# Patient Record
Sex: MALE | Race: WHITE | NOT HISPANIC OR LATINO | Employment: FULL TIME | URBAN - METROPOLITAN AREA
[De-identification: names, ages, dates, MRNs, and addresses within clinical notes are randomized per-mention and may not be internally consistent; named-entity substitution may affect disease eponyms.]

---

## 2017-03-24 ENCOUNTER — ALLSCRIPTS OFFICE VISIT (OUTPATIENT)
Dept: OTHER | Facility: OTHER | Age: 62
End: 2017-03-24

## 2017-03-24 DIAGNOSIS — E78.00 PURE HYPERCHOLESTEROLEMIA: ICD-10-CM

## 2017-03-24 DIAGNOSIS — M25.511 PAIN IN RIGHT SHOULDER: ICD-10-CM

## 2017-04-05 ENCOUNTER — TRANSCRIBE ORDERS (OUTPATIENT)
Dept: ADMINISTRATIVE | Facility: HOSPITAL | Age: 62
End: 2017-04-05

## 2017-04-05 ENCOUNTER — HOSPITAL ENCOUNTER (OUTPATIENT)
Dept: RADIOLOGY | Facility: HOSPITAL | Age: 62
Discharge: HOME | End: 2017-04-05
Attending: FAMILY MEDICINE
Payer: COMMERCIAL

## 2017-04-05 DIAGNOSIS — M25.511 RIGHT SHOULDER PAIN, UNSPECIFIED CHRONICITY: ICD-10-CM

## 2017-04-05 DIAGNOSIS — M25.511 RIGHT SHOULDER PAIN, UNSPECIFIED CHRONICITY: Primary | ICD-10-CM

## 2017-04-05 PROCEDURE — 73030 X-RAY EXAM OF SHOULDER: CPT

## 2017-04-06 ENCOUNTER — GENERIC CONVERSION - ENCOUNTER (OUTPATIENT)
Dept: OTHER | Facility: OTHER | Age: 62
End: 2017-04-06

## 2017-04-06 LAB
AMBIG ABBREV CMP14 DEFAULT (HISTORICAL): NORMAL
AMBIG ABBREV LP DEFAULT (HISTORICAL): NORMAL
BASOPHILS # BLD AUTO: 0.1 X10E3/UL (ref 0–0.2)
BASOPHILS # BLD AUTO: 1 %
DEPRECATED RDW RBC AUTO: 14.2 % (ref 12.3–15.4)
EOSINOPHIL # BLD AUTO: 0.1 X10E3/UL (ref 0–0.4)
EOSINOPHIL # BLD AUTO: 2 %
HCT VFR BLD AUTO: 43.5 % (ref 37.5–51)
HGB BLD-MCNC: 15 G/DL (ref 12.6–17.7)
IMM.GRANULOCYTES (CD4/8) (HISTORICAL): 0 %
IMM.GRANULOCYTES (CD4/8) (HISTORICAL): 0 X10E3/UL (ref 0–0.1)
LYMPHOCYTES # BLD AUTO: 1.7 X10E3/UL (ref 0.7–3.1)
LYMPHOCYTES # BLD AUTO: 33 %
MCH RBC QN AUTO: 29.9 PG (ref 26.6–33)
MCHC RBC AUTO-ENTMCNC: 34.5 G/DL (ref 31.5–35.7)
MCV RBC AUTO: 87 FL (ref 79–97)
MONOCYTES # BLD AUTO: 0.4 X10E3/UL (ref 0.1–0.9)
MONOCYTES (HISTORICAL): 9 %
NEUTROPHILS # BLD AUTO: 2.9 X10E3/UL (ref 1.4–7)
NEUTROPHILS # BLD AUTO: 55 %
PLATELET # BLD AUTO: 382 X10E3/UL (ref 150–379)
RBC (HISTORICAL): 5.02 X10E6/UL (ref 4.14–5.8)
WBC # BLD AUTO: 5.2 X10E3/UL (ref 3.4–10.8)

## 2017-04-07 LAB
A/G RATIO (HISTORICAL): 1.8 (ref 1.2–2.2)
ALBUMIN SERPL BCP-MCNC: 4.3 G/DL (ref 3.6–4.8)
ALP SERPL-CCNC: 68 IU/L (ref 39–117)
ALT SERPL W P-5'-P-CCNC: 19 IU/L (ref 0–44)
AST SERPL W P-5'-P-CCNC: 21 IU/L (ref 0–40)
BILIRUB SERPL-MCNC: 0.5 MG/DL (ref 0–1.2)
BUN SERPL-MCNC: 20 MG/DL (ref 8–27)
BUN/CREA RATIO (HISTORICAL): 27 (ref 10–24)
CALCIUM SERPL-MCNC: 9.4 MG/DL (ref 8.6–10.2)
CHLORIDE SERPL-SCNC: 100 MMOL/L (ref 96–106)
CHOLEST SERPL-MCNC: 219 MG/DL (ref 100–199)
CO2 SERPL-SCNC: 20 MMOL/L (ref 18–29)
CREAT SERPL-MCNC: 0.75 MG/DL (ref 0.76–1.27)
EGFR AFRICAN AMERICAN (HISTORICAL): 115 ML/MIN/1.73
EGFR-AMERICAN CALC (HISTORICAL): 100 ML/MIN/1.73
GLUCOSE SERPL-MCNC: 94 MG/DL (ref 65–99)
HDLC SERPL-MCNC: 57 MG/DL
LDLC SERPL CALC-MCNC: 143 MG/DL (ref 0–99)
POTASSIUM SERPL-SCNC: 4.6 MMOL/L (ref 3.5–5.2)
SODIUM SERPL-SCNC: 138 MMOL/L (ref 134–144)
TOT. GLOBULIN, SERUM (HISTORICAL): 2.4 G/DL (ref 1.5–4.5)
TOTAL PROTEIN (HISTORICAL): 6.7 G/DL (ref 6–8.5)
TRIGL SERPL-MCNC: 94 MG/DL (ref 0–149)
VLDLC SERPL CALC-MCNC: 19 MG/DL (ref 5–40)

## 2017-05-05 ENCOUNTER — ALLSCRIPTS OFFICE VISIT (OUTPATIENT)
Dept: OTHER | Facility: OTHER | Age: 62
End: 2017-05-05

## 2017-05-05 DIAGNOSIS — R22.31 LOCALIZED SWELLING, MASS AND LUMP, RIGHT UPPER LIMB: ICD-10-CM

## 2017-05-05 DIAGNOSIS — M25.511 PAIN IN RIGHT SHOULDER: ICD-10-CM

## 2017-05-11 ENCOUNTER — HOSPITAL ENCOUNTER (OUTPATIENT)
Dept: RADIOLOGY | Facility: HOSPITAL | Age: 62
Discharge: HOME | End: 2017-05-11
Payer: COMMERCIAL

## 2017-05-11 DIAGNOSIS — R22.31 LOCALIZED SWELLING, MASS AND LUMP, RIGHT UPPER LIMB: ICD-10-CM

## 2017-05-11 PROCEDURE — 76882 US LMTD JT/FCL EVL NVASC XTR: CPT

## 2017-05-19 ENCOUNTER — ALLSCRIPTS OFFICE VISIT (OUTPATIENT)
Dept: OTHER | Facility: OTHER | Age: 62
End: 2017-05-19

## 2017-05-21 ENCOUNTER — HOSPITAL ENCOUNTER (EMERGENCY)
Facility: HOSPITAL | Age: 62
Discharge: HOME | End: 2017-05-21
Attending: EMERGENCY MEDICINE | Admitting: EMERGENCY MEDICINE
Payer: COMMERCIAL

## 2017-05-21 VITALS
WEIGHT: 162 LBS | DIASTOLIC BLOOD PRESSURE: 98 MMHG | SYSTOLIC BLOOD PRESSURE: 156 MMHG | HEART RATE: 86 BPM | HEIGHT: 70 IN | TEMPERATURE: 95.8 F | RESPIRATION RATE: 18 BRPM | BODY MASS INDEX: 23.19 KG/M2 | OXYGEN SATURATION: 98 %

## 2017-05-21 DIAGNOSIS — S61.219A FINGER LACERATION, INITIAL ENCOUNTER: Primary | ICD-10-CM

## 2017-05-21 PROCEDURE — 90471 IMMUNIZATION ADMIN: CPT

## 2017-05-21 PROCEDURE — 90715 TDAP VACCINE 7 YRS/> IM: CPT | Performed by: EMERGENCY MEDICINE

## 2017-05-21 PROCEDURE — 99282 EMERGENCY DEPT VISIT SF MDM: CPT

## 2017-05-21 RX ADMIN — TETANUS TOXOID, REDUCED DIPHTHERIA TOXOID AND ACELLULAR PERTUSSIS VACCINE, ADSORBED 0.5 ML: 5; 2.5; 8; 8; 2.5 SUSPENSION INTRAMUSCULAR at 15:16

## 2017-05-22 LAB — LYME IGG/IGM AB (HISTORICAL): <0.9 INDEX

## 2017-05-23 ENCOUNTER — APPOINTMENT (OUTPATIENT)
Dept: PHYSICAL THERAPY | Facility: CLINIC | Age: 62
End: 2017-05-23
Payer: COMMERCIAL

## 2017-05-23 DIAGNOSIS — M25.511 PAIN IN RIGHT SHOULDER: ICD-10-CM

## 2017-05-23 PROCEDURE — 97161 PT EVAL LOW COMPLEX 20 MIN: CPT

## 2017-05-24 ENCOUNTER — GENERIC CONVERSION - ENCOUNTER (OUTPATIENT)
Dept: OTHER | Facility: OTHER | Age: 62
End: 2017-05-24

## 2017-06-01 ENCOUNTER — APPOINTMENT (OUTPATIENT)
Dept: PHYSICAL THERAPY | Facility: CLINIC | Age: 62
End: 2017-06-01
Payer: COMMERCIAL

## 2017-06-01 PROCEDURE — 97110 THERAPEUTIC EXERCISES: CPT

## 2017-06-08 ENCOUNTER — APPOINTMENT (OUTPATIENT)
Dept: PHYSICAL THERAPY | Facility: CLINIC | Age: 62
End: 2017-06-08
Payer: COMMERCIAL

## 2017-06-08 PROCEDURE — 97110 THERAPEUTIC EXERCISES: CPT

## 2017-06-13 ENCOUNTER — ALLSCRIPTS OFFICE VISIT (OUTPATIENT)
Dept: OTHER | Facility: OTHER | Age: 62
End: 2017-06-13

## 2017-06-15 ENCOUNTER — APPOINTMENT (OUTPATIENT)
Dept: PHYSICAL THERAPY | Facility: CLINIC | Age: 62
End: 2017-06-15
Payer: COMMERCIAL

## 2017-06-15 PROCEDURE — 97110 THERAPEUTIC EXERCISES: CPT

## 2017-06-21 ENCOUNTER — APPOINTMENT (OUTPATIENT)
Dept: PHYSICAL THERAPY | Facility: CLINIC | Age: 62
End: 2017-06-21
Payer: COMMERCIAL

## 2017-06-21 PROCEDURE — 97110 THERAPEUTIC EXERCISES: CPT

## 2017-06-22 ENCOUNTER — APPOINTMENT (OUTPATIENT)
Dept: PHYSICAL THERAPY | Facility: CLINIC | Age: 62
End: 2017-06-22
Payer: COMMERCIAL

## 2017-06-29 ENCOUNTER — APPOINTMENT (OUTPATIENT)
Dept: PHYSICAL THERAPY | Facility: CLINIC | Age: 62
End: 2017-06-29
Payer: COMMERCIAL

## 2017-11-21 ENCOUNTER — GENERIC CONVERSION - ENCOUNTER (OUTPATIENT)
Dept: OTHER | Facility: OTHER | Age: 62
End: 2017-11-21

## 2017-11-21 DIAGNOSIS — M54.10 RADICULOPATHY: ICD-10-CM

## 2018-01-12 VITALS
BODY MASS INDEX: 22.96 KG/M2 | RESPIRATION RATE: 18 BRPM | OXYGEN SATURATION: 98 % | WEIGHT: 164 LBS | HEIGHT: 71 IN | SYSTOLIC BLOOD PRESSURE: 150 MMHG | HEART RATE: 80 BPM | DIASTOLIC BLOOD PRESSURE: 90 MMHG

## 2018-01-13 VITALS
TEMPERATURE: 96.3 F | SYSTOLIC BLOOD PRESSURE: 112 MMHG | DIASTOLIC BLOOD PRESSURE: 80 MMHG | HEART RATE: 68 BPM | WEIGHT: 162 LBS | HEIGHT: 71 IN | OXYGEN SATURATION: 98 % | BODY MASS INDEX: 22.68 KG/M2 | RESPIRATION RATE: 18 BRPM

## 2018-01-13 VITALS
BODY MASS INDEX: 22.68 KG/M2 | DIASTOLIC BLOOD PRESSURE: 72 MMHG | RESPIRATION RATE: 18 BRPM | WEIGHT: 162 LBS | OXYGEN SATURATION: 98 % | SYSTOLIC BLOOD PRESSURE: 128 MMHG | HEART RATE: 92 BPM | HEIGHT: 71 IN

## 2018-01-15 VITALS
WEIGHT: 170 LBS | RESPIRATION RATE: 18 BRPM | DIASTOLIC BLOOD PRESSURE: 84 MMHG | HEART RATE: 74 BPM | BODY MASS INDEX: 23.8 KG/M2 | SYSTOLIC BLOOD PRESSURE: 140 MMHG | HEIGHT: 71 IN | OXYGEN SATURATION: 98 %

## 2018-01-16 NOTE — PROGRESS NOTES
Assessment    1  Encounter for preventive health examination (V70 0) (Z00 00)   2  Encounter for screening colonoscopy (V76 51) (Z12 11)   3  Right shoulder pain (719 41) (M25 511)   4  Influenza vaccine refused (V64 06) (Z28 21)   5  Hypercholesteremia (272 0) (E78 00)   6  Screening for depression (V79 0) (Z13 89)   7  Elevated BP without diagnosis of hypertension (796 2) (R03 0)    Plan  Encounter for screening colonoscopy    · COLONOSCOPY; Status:Active; Requested for:24Mar2017;   Hypercholesteremia    · (1) CBC/PLT/DIFF; Status:Active; Requested for:24Mar2017;    · (1) COMPREHENSIVE METABOLIC PANEL; Status:Active; Requested for:24Mar2017;    · (1) LIPID PANEL, FASTING; Status:Active; Requested for:24Mar2017; Influenza vaccine refused    · Stop: Influenza  Right shoulder pain    · * XR SHOULDER 2+ VIEW RIGHT; Status:Active; Requested for:24Mar2017;   Screening for depression    · *VB-Depression Screening; Status:Resulted - Requires Verification;   Done: [id number]  01:48PM    Discussion/Summary  Impression: healthy adult male  Currently, he eats a healthy diet and has an adequate exercise regimen  Prostate cancer screening: prostate cancer screening is managed by Dr Shareen Primrose  Colorectal cancer screening: colonoscopy has been ordered  Screening lab work includes glucose and lipid profile  The patient declines immunizations  He was advised to be evaluated by an optometrist  Advice and education were given regarding helmet use  Patient discussion: discussed with the patient  Right Shoulder Pain: Order for right shoulder xray given today  Patient may have component MSK dysfunction causing him the pain  I do not believe that the patient's arm mass is the source of his pain  If xray negative  Will consider physical therapy trial for strengthening and stretching  Further consultation with specialist should also be considered  Arm Mass: Likely clinical secondary to benign mass such as a lipoma   Patient advised that we could continue to monitor for changes in size and or symptoms such as pain, at which time we can get a surgical consult, or have it addressed now  Patient prefers to wait  Elevated Blood Pressure: Patient has borderline HTN  Patient prefers to try lifestyle changes initially, specifically a low-salt diet as he admits to eating a lot of salt  Hypercholesterolemia: History of elevated cholesterol  Lipid panel order provided today  Screening: Colonoscopy ordered today  Blood work ordered today    Follow up in 2 weeks for xray and blood work results  Chief Complaint  CPE      History of Present Illness  HM, Adult Male: The patient is being seen for a health maintenance evaluation  The last health maintenance visit was 1-2 year(s) ago  Social History: Household members include spouse, 1 daughter(s) and 1 son(s)  He is   Work status: working full time and occupation: design and sales  The patient is a former cigarette smoker  He reports occasional alcohol use  He has never used illicit drugs  General Health: The patient's health since the last visit is described as good  He has regular dental visits  The patient brushes 2 time(s) a day and flosses occasionally  Dental problems: no tooth pain and no caries  He complains of vision problems  Vision care includes wearing reading glasses  The patient complains of worsening vision  Lifestyle:  He consumes a diverse and healthy diet  Dietary details include 2 servings of fruit per day, 2 servings of vegetables per day, 1 servings of meat per day, 42 ounces of water per day, 2 cups of coffee per day, 2 cups of tea per day and 0 cans of regular soda per day  He exercises regularly  He exercises 3 or more times per week  Exercise includes biking  Reproductive health:  the patient is sexually active  He is monogamous with a female partner  Screening:   HPI: 63year old male comes to the office today for a well visit   Patient has history of prostate cancer and is currently being followed by urology (Dr Therese Hicks)  Patient is also concerned about right shoulder pain and a lump on the right bicep area  He first noted the bump approximately 6 months ago  He has not noticed a change in size  States that he occasionally gets arm pain and is no sure if it's related to the lump  Does not have a similar lump elsewhere  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  Review of Systems    Constitutional: no fever and no chills  Eyes: no eyesight problems  ENT: no sore throat and no nasal discharge  Cardiovascular: no chest pain and no palpitations  Respiratory: no shortness of breath and no cough  Gastrointestinal: no abdominal pain, no nausea, no vomiting, no constipation and no diarrhea  Genitourinary: no dysuria  Musculoskeletal: myalgias, but as noted in HPI  Integumentary: skin lesion, but as noted in HPI  Active Problems    1  Elevated BP without diagnosis of hypertension (796 2) (R03 0)   2  Influenza vaccine refused (V64 06) (Z28 21)    Past Medical History    · History of Cyst of neck (706 2) (L72 3)   · History of Dysuria (788 1) (R30 0)   · History of Elevated PSA (790 93) (R97 20)   · History of malignant neoplasm of prostate (V10 46) (Z85 46)   · History of Tick bite (919 4,E906 4) (W57 XXXA)   · History of UTI (lower urinary tract infection) (599 0) (N39 0)    Family History  Mother    · Family history of leukemia (V16 6) (Z80 6)   · Family history of multiple sclerosis (V17 2) (Z82 0)  Sister    · Family history of multiple sclerosis (V17 2) (Z82 0)    Social History    · Never a smoker   · Non-smoker (V49 89) (Z78 9)    Current Meds   1  No Reported Medications Recorded    Allergies    1  No Known Drug Allergies    2   No Known Latex Allergies    Vitals   Recorded: 22XGQ0717 01:04PM   Heart Rate 74   Respiration 18   Systolic 247   Diastolic 84   Height 5 ft 10 5 in   Weight 170 lb    BMI Calculated 24 05   BSA Calculated 1 96   O2 Saturation 98     Physical Exam    Constitutional   General appearance: No acute distress, well appearing and well nourished  Head and Face   Head and face: Normal     Palpation of the face and sinuses: No sinus tenderness  Eyes   Conjunctiva and lids: No erythema, swelling or discharge  Pupils and irises: Equal, round, reactive to light  Ears, Nose, Mouth, and Throat   External inspection of ears and nose: Normal     Otoscopic examination: Tympanic membranes translucent with normal light reflex  Canals patent without erythema  Nasal mucosa, septum, and turbinates: Normal without edema or erythema  Lips, teeth, and gums: Normal, good dentition  Oropharynx: Normal with no erythema, edema, exudate or lesions  Pulmonary   Respiratory effort: No increased work of breathing or signs of respiratory distress  Auscultation of lungs: Clear to auscultation  Cardiovascular   Auscultation of heart: Normal rate and rhythm, normal S1 and S2, no murmurs  Abdomen   Abdomen: Non-tender, no masses  Lymphatic   Palpation of lymph nodes in neck: No lymphadenopathy  Palpation of lymph nodes in axillae: No lymphadenopathy  Musculoskeletal + [place]'s and Dynamic Speed test of right shoulder; full range of motion; 5/5 strength in UE's bilaterally; [place] Muscle strength/tone: Normal     Skin   Examination of the skin for lesions: Abnormal   right mass approximately 3cm in diameter located medial aspect of upper arm at inferior-mid area; soft, non-tender, mobile  Psychiatric   Recent and remote memory: Intact      Mood and affect: Normal        Results/Data  *VB-Depression Screening [id number] 01:48PM Rolando Child     Test Name Result Flag Reference   Depression Scale Result      Depression Screen - Negative For Symptoms     PHQ-9 Adult Depression Screening 24Mar2017 01:47PM Hilary Clark     Test Name Result Flag Reference   PHQ-9 Adult Depression Score 0 Over the last two weeks, how often have you been bothered by any of the following problems? Little interest or pleasure in doing things: Not at all - 0  Feeling down, depressed, or hopeless: Not at all - 0  Trouble falling or staying asleep, or sleeping too much: Not at all - 0  Feeling tired or having little energy: Not at all - 0  Poor appetite or over eating: Not at all - 0  Feeling bad about yourself - or that you are a failure or have let yourself or your family down: Not at all - 0  Trouble concentrating on things, such as reading the newspaper or watching television: Not at all - 0  Moving or speaking so slowly that other people could have noticed  Or the opposite -  being so fidgety or restless that you have been moving around a lot more than usual: Not at all - 0  Thoughts that you would be better off dead, or of hurting yourself in some way: Not at all - 0   PHQ-9 Adult Depression Screening Negative     PHQ-9 Difficulty Level Not difficult at all     PHQ-9 Severity No Depression         Attending Note  Attending Note: Attending Note: I did not interview and examine the patient, I supervised the Resident and I agree with the Resident management plan as it was presented to me  Level of Participation: I was present in clinic, but did not examine the patient  I agree with the Resident's note  Signatures   Electronically signed by : SLICK Felipe ; Mar 24 2017  3:23PM EST                       (Author)    Electronically signed by :  SLICK Kerr ; Mar 24 2017  3:27PM EST                       (Co-author)

## 2018-01-22 VITALS
RESPIRATION RATE: 18 BRPM | TEMPERATURE: 98.6 F | OXYGEN SATURATION: 98 % | DIASTOLIC BLOOD PRESSURE: 100 MMHG | HEART RATE: 74 BPM | HEIGHT: 71 IN | SYSTOLIC BLOOD PRESSURE: 140 MMHG | BODY MASS INDEX: 23.24 KG/M2 | WEIGHT: 166 LBS

## 2018-02-01 ENCOUNTER — TRANSCRIBE ORDERS (OUTPATIENT)
Dept: ADMINISTRATIVE | Facility: HOSPITAL | Age: 63
End: 2018-02-01

## 2018-02-01 ENCOUNTER — HOSPITAL ENCOUNTER (OUTPATIENT)
Dept: RADIOLOGY | Facility: HOSPITAL | Age: 63
Discharge: HOME | End: 2018-02-01
Attending: FAMILY MEDICINE
Payer: COMMERCIAL

## 2018-02-01 DIAGNOSIS — M54.10 RADICULOPATHY: ICD-10-CM

## 2018-02-01 DIAGNOSIS — M54.10 RADICULAR SYNDROME OF LOWER LIMBS: Primary | ICD-10-CM

## 2018-02-01 PROCEDURE — 72050 X-RAY EXAM NECK SPINE 4/5VWS: CPT

## 2018-02-09 ENCOUNTER — TELEPHONE (OUTPATIENT)
Dept: FAMILY MEDICINE CLINIC | Facility: CLINIC | Age: 63
End: 2018-02-09

## 2018-09-20 ENCOUNTER — OFFICE VISIT (OUTPATIENT)
Dept: FAMILY MEDICINE CLINIC | Facility: CLINIC | Age: 63
End: 2018-09-20
Payer: COMMERCIAL

## 2018-09-20 VITALS
TEMPERATURE: 96.1 F | HEART RATE: 78 BPM | OXYGEN SATURATION: 98 % | RESPIRATION RATE: 18 BRPM | SYSTOLIC BLOOD PRESSURE: 142 MMHG | WEIGHT: 161.38 LBS | DIASTOLIC BLOOD PRESSURE: 92 MMHG | HEIGHT: 70 IN | BODY MASS INDEX: 23.1 KG/M2

## 2018-09-20 DIAGNOSIS — C61 PROSTATE CANCER (HCC): ICD-10-CM

## 2018-09-20 DIAGNOSIS — M89.9 BONE DISORDER: ICD-10-CM

## 2018-09-20 DIAGNOSIS — Z12.11 SCREENING FOR COLON CANCER: ICD-10-CM

## 2018-09-20 DIAGNOSIS — E78.2 MIXED HYPERLIPIDEMIA: ICD-10-CM

## 2018-09-20 DIAGNOSIS — R63.4 WEIGHT LOSS: ICD-10-CM

## 2018-09-20 DIAGNOSIS — R03.0 ELEVATED BP WITHOUT DIAGNOSIS OF HYPERTENSION: Primary | ICD-10-CM

## 2018-09-20 PROBLEM — D17.22 LIPOMA OF LEFT UPPER EXTREMITY: Status: ACTIVE | Noted: 2017-05-19

## 2018-09-20 PROBLEM — M25.511 RIGHT SHOULDER PAIN: Status: ACTIVE | Noted: 2017-03-24

## 2018-09-20 PROBLEM — E78.00 HYPERCHOLESTEREMIA: Status: ACTIVE | Noted: 2017-03-24

## 2018-09-20 PROBLEM — L91.8 ACROCHORDON: Status: ACTIVE | Noted: 2017-06-13

## 2018-09-20 PROBLEM — D17.21 LIPOMA OF RIGHT UPPER EXTREMITY: Status: ACTIVE | Noted: 2017-05-19

## 2018-09-20 PROCEDURE — 3008F BODY MASS INDEX DOCD: CPT | Performed by: FAMILY MEDICINE

## 2018-09-20 PROCEDURE — 99214 OFFICE O/P EST MOD 30 MIN: CPT | Performed by: FAMILY MEDICINE

## 2018-09-20 NOTE — PROGRESS NOTES
Assessment/Plan:     repeat bp 148/84  Will order routine labs to follow up on problems including hl and fatigue will conr supplements  And f/u with urol  Will rec eye and dental f/u  cologard  For screening     Will rec flu vacc when available  F/u bp with several readings     There are no diagnoses linked to this encounter  Subjective:      Patient ID: Will Mariano is a 66 y o  male  Patient seen  For check up  Has hx h lprostate ca diagnosed  In 2015  Did have prost bx  [place] 6 No treatment only surveillance    Last psa  5 4  wll have repeat in 6 weeks Followed by urol  Lipomas kulwant  Chronic rt shoulder pain  Here for labs  Is taking supplement   Has been losing weight  But did change his diet  Needs to do colon screening  Does have some vision changes and inc urin sx  Does take supplements no pres meds needs to see eye doc         The following portions of the patient's history were reviewed and updated as appropriate: past family history, past medical history, past social history and past surgical history  Review of Systems   Constitutional: Positive for fatigue  HENT: Negative  Eyes: Positive for visual disturbance  Respiratory: Negative  Cardiovascular: Negative  Gastrointestinal: Negative  Endocrine: Negative  Genitourinary: Positive for frequency  Weaker stream   Musculoskeletal: Negative  Skin: Negative  Neurological: Negative  Psychiatric/Behavioral: Negative  Objective:      /92 (BP Location: Left arm, Patient Position: Sitting)   Pulse 78   Temp (!) 96 1 °F (35 6 °C) (Tympanic)   Resp 18   Ht 5' 10" (1 778 m)   Wt 73 2 kg (161 lb 6 oz)   SpO2 98%   BMI 23 15 kg/m²          Physical Exam   Constitutional: He is oriented to person, place, and time  He appears well-developed and well-nourished  HENT:   Head: Normocephalic and atraumatic     Right Ear: External ear normal    Left Ear: External ear normal    Nose: Nose normal  Mouth/Throat: Oropharynx is clear and moist    Eyes: Conjunctivae and EOM are normal  Pupils are equal, round, and reactive to light  Neck: Normal range of motion  Neck supple  No thyromegaly present  Cardiovascular: Normal rate, regular rhythm and normal heart sounds  Pulmonary/Chest: Effort normal and breath sounds normal  He has no wheezes  Abdominal: Soft  Bowel sounds are normal  He exhibits no mass  There is no tenderness  Musculoskeletal: Normal range of motion  Lymphadenopathy:     He has no cervical adenopathy  Neurological: He is alert and oriented to person, place, and time  Skin: Skin is warm  Psychiatric: He has a normal mood and affect   His behavior is normal  Thought content normal

## 2018-09-26 LAB
25(OH)D3+25(OH)D2 SERPL-MCNC: 33 NG/ML (ref 30–100)
ALBUMIN SERPL-MCNC: 4.2 G/DL (ref 3.6–4.8)
ALBUMIN/GLOB SERPL: 1.6 {RATIO} (ref 1.2–2.2)
ALP SERPL-CCNC: 59 IU/L (ref 39–117)
ALT SERPL-CCNC: 21 IU/L (ref 0–44)
AMBIG ABBREV DEFAULT: NORMAL
AST SERPL-CCNC: 21 IU/L (ref 0–40)
BILIRUB SERPL-MCNC: 0.5 MG/DL (ref 0–1.2)
BUN SERPL-MCNC: 18 MG/DL (ref 8–27)
BUN/CREAT SERPL: 24 (ref 10–24)
CALCIUM SERPL-MCNC: 9.2 MG/DL (ref 8.6–10.2)
CHLORIDE SERPL-SCNC: 100 MMOL/L (ref 96–106)
CHOLEST SERPL-MCNC: 206 MG/DL (ref 100–199)
CO2 SERPL-SCNC: 25 MMOL/L (ref 20–29)
CREAT SERPL-MCNC: 0.76 MG/DL (ref 0.76–1.27)
ERYTHROCYTE [DISTWIDTH] IN BLOOD BY AUTOMATED COUNT: 14.6 % (ref 12.3–15.4)
GLOBULIN SER-MCNC: 2.6 G/DL (ref 1.5–4.5)
GLUCOSE SERPL-MCNC: 90 MG/DL (ref 65–99)
HCT VFR BLD AUTO: 43.7 % (ref 37.5–51)
HDLC SERPL-MCNC: 51 MG/DL
HGB BLD-MCNC: 14.5 G/DL (ref 13–17.7)
LDLC SERPL CALC-MCNC: 128 MG/DL (ref 0–99)
MCH RBC QN AUTO: 29.3 PG (ref 26.6–33)
MCHC RBC AUTO-ENTMCNC: 33.2 G/DL (ref 31.5–35.7)
MCV RBC AUTO: 88 FL (ref 79–97)
PLATELET # BLD AUTO: 354 X10E3/UL (ref 150–379)
POTASSIUM SERPL-SCNC: 4.5 MMOL/L (ref 3.5–5.2)
PROT SERPL-MCNC: 6.8 G/DL (ref 6–8.5)
RBC # BLD AUTO: 4.95 X10E6/UL (ref 4.14–5.8)
SL AMB EGFR AFRICAN AMERICAN: 114 ML/MIN/1.73
SL AMB EGFR NON AFRICAN AMERICAN: 98 ML/MIN/1.73
SODIUM SERPL-SCNC: 139 MMOL/L (ref 134–144)
TRIGL SERPL-MCNC: 135 MG/DL (ref 0–149)
TSH SERPL DL<=0.005 MIU/L-ACNC: 1.86 UIU/ML (ref 0.45–4.5)
WBC # BLD AUTO: 6.7 X10E3/UL (ref 3.4–10.8)

## 2018-12-19 ENCOUNTER — TELEPHONE (OUTPATIENT)
Dept: FAMILY MEDICINE CLINIC | Facility: CLINIC | Age: 63
End: 2018-12-19

## 2018-12-19 NOTE — TELEPHONE ENCOUNTER
Patient came in for CPE on 9/20 and needs the dx code changed to a preventative visit  Can you help with this? Dr Mandi Pereira says he doesn't know how?

## 2019-01-22 ENCOUNTER — TELEPHONE (OUTPATIENT)
Dept: FAMILY MEDICINE CLINIC | Facility: CLINIC | Age: 64
End: 2019-01-22

## 2019-01-22 NOTE — TELEPHONE ENCOUNTER
Pt would like a call with his cologuard results, they are not available on [employer] yet

## 2019-01-22 NOTE — TELEPHONE ENCOUNTER
Spoke to patient and informed him of his negative cologuard  Pt states he will go to [place] for MRI of prostate ( prostate cancer) as he found a hospital that does it without Gadolinium

## 2019-05-23 ENCOUNTER — OFFICE VISIT (OUTPATIENT)
Dept: FAMILY MEDICINE CLINIC | Facility: CLINIC | Age: 64
End: 2019-05-23
Payer: COMMERCIAL

## 2019-05-23 VITALS
BODY MASS INDEX: 22.75 KG/M2 | DIASTOLIC BLOOD PRESSURE: 82 MMHG | SYSTOLIC BLOOD PRESSURE: 120 MMHG | TEMPERATURE: 97.6 F | WEIGHT: 158.56 LBS | HEART RATE: 82 BPM | OXYGEN SATURATION: 96 %

## 2019-05-23 DIAGNOSIS — L84 CALLUS: Primary | ICD-10-CM

## 2019-05-23 PROCEDURE — 99213 OFFICE O/P EST LOW 20 MIN: CPT | Performed by: NURSE PRACTITIONER

## 2019-05-23 PROCEDURE — 1036F TOBACCO NON-USER: CPT | Performed by: NURSE PRACTITIONER

## 2019-05-23 RX ORDER — ASPIRIN 81 MG/1
81 TABLET ORAL 2 TIMES WEEKLY
COMMUNITY

## 2019-05-23 RX ORDER — ASCORBIC ACID 1000 MG
1260 TABLET, EXTENDED RELEASE ORAL DAILY
COMMUNITY

## 2019-12-11 ENCOUNTER — OFFICE VISIT (OUTPATIENT)
Dept: FAMILY MEDICINE CLINIC | Facility: CLINIC | Age: 64
End: 2019-12-11
Payer: COMMERCIAL

## 2019-12-11 VITALS
DIASTOLIC BLOOD PRESSURE: 98 MMHG | SYSTOLIC BLOOD PRESSURE: 140 MMHG | RESPIRATION RATE: 18 BRPM | WEIGHT: 161 LBS | HEART RATE: 76 BPM | HEIGHT: 70 IN | BODY MASS INDEX: 23.05 KG/M2

## 2019-12-11 DIAGNOSIS — L72.9 INFECTED CYST OF SKIN: Primary | ICD-10-CM

## 2019-12-11 DIAGNOSIS — L08.9 INFECTED CYST OF SKIN: Primary | ICD-10-CM

## 2019-12-11 PROCEDURE — 3008F BODY MASS INDEX DOCD: CPT | Performed by: NURSE PRACTITIONER

## 2019-12-11 PROCEDURE — 99213 OFFICE O/P EST LOW 20 MIN: CPT | Performed by: NURSE PRACTITIONER

## 2019-12-11 RX ORDER — SULFAMETHOXAZOLE AND TRIMETHOPRIM 800; 160 MG/1; MG/1
1 TABLET ORAL EVERY 12 HOURS SCHEDULED
Qty: 20 TABLET | Refills: 0 | Status: SHIPPED | OUTPATIENT
Start: 2019-12-11 | End: 2019-12-12

## 2019-12-11 NOTE — PROGRESS NOTES
Assessment/Plan:  1  Patient to come back tomorrow for a procedure to have the cyst drained  2  Start antibiotics  3  Follow-up condition changes or worsens  4  Apply warm compresses to the cystic couple times a day         Diagnoses and all orders for this visit:    Infected cyst of skin  -     sulfamethoxazole-trimethoprim (BACTRIM DS) 800-160 mg per tablet; Take 1 tablet by mouth every 12 (twelve) hours for 10 days          Subjective:      Patient ID: Leobardo Alaniz is a 60 y o  male  37-year-old male presents with cyst on his back for while  Reports that it is painful  Reports he has history of cyst   Denies any injury  Reports it is painful  Denies medications  The following portions of the patient's history were reviewed and updated as appropriate: allergies and current medications  Review of Systems   Constitutional: Negative  Skin:        Cyst         Objective:      /98   Pulse 76   Resp 18   Ht 5' 10" (1 778 m)   Wt 73 kg (161 lb)   BMI 23 10 kg/m²          Physical Exam   Constitutional: He appears well-developed and well-nourished     Skin:   2 cm cyst on upper portion of back/talked/tender/erythema

## 2019-12-12 ENCOUNTER — OFFICE VISIT (OUTPATIENT)
Dept: FAMILY MEDICINE CLINIC | Facility: CLINIC | Age: 64
End: 2019-12-12
Payer: COMMERCIAL

## 2019-12-12 VITALS
BODY MASS INDEX: 22.81 KG/M2 | SYSTOLIC BLOOD PRESSURE: 136 MMHG | TEMPERATURE: 96.1 F | WEIGHT: 159 LBS | HEART RATE: 80 BPM | OXYGEN SATURATION: 98 % | RESPIRATION RATE: 18 BRPM | DIASTOLIC BLOOD PRESSURE: 76 MMHG

## 2019-12-12 DIAGNOSIS — L08.9 INFECTED CYST OF SKIN: Primary | ICD-10-CM

## 2019-12-12 DIAGNOSIS — L72.9 INFECTED CYST OF SKIN: Primary | ICD-10-CM

## 2019-12-12 PROCEDURE — 99211 OFF/OP EST MAY X REQ PHY/QHP: CPT | Performed by: FAMILY MEDICINE

## 2019-12-12 PROCEDURE — 1036F TOBACCO NON-USER: CPT | Performed by: FAMILY MEDICINE

## 2019-12-12 PROCEDURE — 10060 I&D ABSCESS SIMPLE/SINGLE: CPT | Performed by: FAMILY MEDICINE

## 2019-12-12 RX ORDER — SULFAMETHOXAZOLE AND TRIMETHOPRIM 800; 160 MG/1; MG/1
1 TABLET ORAL EVERY 12 HOURS SCHEDULED
Qty: 20 TABLET | Refills: 0 | Status: SHIPPED | OUTPATIENT
Start: 2019-12-12 | End: 2019-12-22

## 2019-12-12 NOTE — PROGRESS NOTES
Incision and Drainage  Date/Time: 12/12/2019 11:09 AM  Performed by: James Del Rosario DO  Authorized by: James Del Rosario DO     Patient location:  Clinic  Other Assisting Provider: No    Consent:     Consent obtained:  Verbal and written    Consent given by:  Patient    Risks discussed:  Bleeding, incomplete drainage, pain and infection    Alternatives discussed:  No treatment, alternative treatment, referral and delayed treatment  Universal protocol:     Procedure explained and questions answered to patient or proxy's satisfaction: yes      Patient identity confirmed:  Verbally with patient and provided demographic data  Location:     Type:  Cyst    Size:  2 x 3 inches    Location:  Trunk    Trunk location:  Back (Left upper back)  Pre-procedure details:     Skin preparation:  Betadine  Anesthesia (see MAR for exact dosages): Anesthesia method:  Local infiltration    Local anesthetic:  Lidocaine 2% WITH epi  Procedure details:     Complexity:  Simple    Needle aspiration: no      Incision types:  Stab incision    Scalpel blade:  11    Approach:  Puncture    Incision depth:  Subcutaneous    Drainage:  Serosanguinous    Drainage amount:  Copious    Wound treatment:  Wound left open    Packing materials:  None  Post-procedure details:     Patient tolerance of procedure: Tolerated well, no immediate complications      Finish out antibiotic treatment - full course of bactrim DS q12h for 10 days  Follow up if symptoms worsen or fail to improve with antibiotic treatment  James Del Rosario DO  12/12/19  11:13 AM    Some portions of this record may have been generated with voice recognition software  There may be translation, syntax, or grammatical errors  Occasional wrong word or "sound-a-like" substitutions may have occurred due to the inherent limitations of the voice recognition software  Read the chart carefully and recognize, using context, where substations may have occurred   If you have any questions, please contact the dictating provider for clarification or correction, as needed

## 2020-05-14 ENCOUNTER — TELEPHONE (OUTPATIENT)
Dept: FAMILY MEDICINE CLINIC | Facility: CLINIC | Age: 65
End: 2020-05-14

## 2020-05-14 DIAGNOSIS — C61 PROSTATE CANCER (HCC): Primary | ICD-10-CM

## 2020-11-17 ENCOUNTER — TELEPHONE (OUTPATIENT)
Dept: FAMILY MEDICINE CLINIC | Facility: CLINIC | Age: 65
End: 2020-11-17

## 2020-12-01 ENCOUNTER — OFFICE VISIT (OUTPATIENT)
Dept: FAMILY MEDICINE CLINIC | Facility: CLINIC | Age: 65
End: 2020-12-01
Payer: COMMERCIAL

## 2020-12-01 VITALS
OXYGEN SATURATION: 97 % | HEART RATE: 83 BPM | DIASTOLIC BLOOD PRESSURE: 86 MMHG | SYSTOLIC BLOOD PRESSURE: 138 MMHG | RESPIRATION RATE: 18 BRPM | TEMPERATURE: 97.9 F | BODY MASS INDEX: 23.26 KG/M2 | WEIGHT: 162.5 LBS | HEIGHT: 70 IN

## 2020-12-01 DIAGNOSIS — Z13.1 SCREENING FOR DIABETES MELLITUS: ICD-10-CM

## 2020-12-01 DIAGNOSIS — L08.9 INFECTED CYST OF SKIN: Primary | ICD-10-CM

## 2020-12-01 DIAGNOSIS — Z00.00 ANNUAL PHYSICAL EXAM: ICD-10-CM

## 2020-12-01 DIAGNOSIS — E55.9 VITAMIN D DEFICIENCY: ICD-10-CM

## 2020-12-01 DIAGNOSIS — L72.9 INFECTED CYST OF SKIN: Primary | ICD-10-CM

## 2020-12-01 PROCEDURE — 99213 OFFICE O/P EST LOW 20 MIN: CPT | Performed by: FAMILY MEDICINE

## 2020-12-01 PROCEDURE — 3725F SCREEN DEPRESSION PERFORMED: CPT | Performed by: FAMILY MEDICINE

## 2020-12-03 LAB
25(OH)D3+25(OH)D2 SERPL-MCNC: 49.9 NG/ML (ref 30–100)
ALBUMIN SERPL-MCNC: 4.5 G/DL (ref 3.8–4.8)
ALBUMIN/GLOB SERPL: 1.8 {RATIO} (ref 1.2–2.2)
ALP SERPL-CCNC: 71 IU/L (ref 39–117)
ALT SERPL-CCNC: 26 IU/L (ref 0–44)
AST SERPL-CCNC: 27 IU/L (ref 0–40)
BASOPHILS # BLD AUTO: 0.1 X10E3/UL (ref 0–0.2)
BASOPHILS NFR BLD AUTO: 1 %
BILIRUB SERPL-MCNC: 0.4 MG/DL (ref 0–1.2)
BUN SERPL-MCNC: 24 MG/DL (ref 8–27)
BUN/CREAT SERPL: 29 (ref 10–24)
CALCIUM SERPL-MCNC: 9.4 MG/DL (ref 8.6–10.2)
CHLORIDE SERPL-SCNC: 99 MMOL/L (ref 96–106)
CHOLEST SERPL-MCNC: 263 MG/DL (ref 100–199)
CO2 SERPL-SCNC: 26 MMOL/L (ref 20–29)
CREAT SERPL-MCNC: 0.82 MG/DL (ref 0.76–1.27)
EOSINOPHIL # BLD AUTO: 0.1 X10E3/UL (ref 0–0.4)
EOSINOPHIL NFR BLD AUTO: 2 %
ERYTHROCYTE [DISTWIDTH] IN BLOOD BY AUTOMATED COUNT: 13.2 % (ref 11.6–15.4)
EST. AVERAGE GLUCOSE BLD GHB EST-MCNC: 108 MG/DL
GLOBULIN SER-MCNC: 2.5 G/DL (ref 1.5–4.5)
GLUCOSE SERPL-MCNC: 93 MG/DL (ref 65–99)
HBA1C MFR BLD: 5.4 % (ref 4.8–5.6)
HCT VFR BLD AUTO: 46.4 % (ref 37.5–51)
HDLC SERPL-MCNC: 59 MG/DL
HGB BLD-MCNC: 15.3 G/DL (ref 13–17.7)
IMM GRANULOCYTES # BLD: 0 X10E3/UL (ref 0–0.1)
IMM GRANULOCYTES NFR BLD: 0 %
LDLC SERPL CALC-MCNC: 168 MG/DL (ref 0–99)
LYMPHOCYTES # BLD AUTO: 1.7 X10E3/UL (ref 0.7–3.1)
LYMPHOCYTES NFR BLD AUTO: 26 %
MCH RBC QN AUTO: 29.4 PG (ref 26.6–33)
MCHC RBC AUTO-ENTMCNC: 33 G/DL (ref 31.5–35.7)
MCV RBC AUTO: 89 FL (ref 79–97)
MONOCYTES # BLD AUTO: 0.7 X10E3/UL (ref 0.1–0.9)
MONOCYTES NFR BLD AUTO: 10 %
NEUTROPHILS # BLD AUTO: 4.1 X10E3/UL (ref 1.4–7)
NEUTROPHILS NFR BLD AUTO: 61 %
PLATELET # BLD AUTO: 384 X10E3/UL (ref 150–450)
POTASSIUM SERPL-SCNC: 4.7 MMOL/L (ref 3.5–5.2)
PROT SERPL-MCNC: 7 G/DL (ref 6–8.5)
RBC # BLD AUTO: 5.2 X10E6/UL (ref 4.14–5.8)
SL AMB EGFR AFRICAN AMERICAN: 109 ML/MIN/1.73
SL AMB EGFR NON AFRICAN AMERICAN: 94 ML/MIN/1.73
SODIUM SERPL-SCNC: 137 MMOL/L (ref 134–144)
TRIGL SERPL-MCNC: 197 MG/DL (ref 0–149)
WBC # BLD AUTO: 6.7 X10E3/UL (ref 3.4–10.8)

## 2020-12-10 ENCOUNTER — OFFICE VISIT (OUTPATIENT)
Dept: FAMILY MEDICINE CLINIC | Facility: CLINIC | Age: 65
End: 2020-12-10
Payer: COMMERCIAL

## 2020-12-10 VITALS
DIASTOLIC BLOOD PRESSURE: 94 MMHG | HEART RATE: 91 BPM | RESPIRATION RATE: 18 BRPM | SYSTOLIC BLOOD PRESSURE: 146 MMHG | OXYGEN SATURATION: 97 % | TEMPERATURE: 97.3 F | WEIGHT: 164.3 LBS | BODY MASS INDEX: 23.52 KG/M2 | HEIGHT: 70 IN

## 2020-12-10 DIAGNOSIS — L08.9 INFECTED CYST OF SKIN: ICD-10-CM

## 2020-12-10 DIAGNOSIS — C61 PROSTATE CANCER (HCC): ICD-10-CM

## 2020-12-10 DIAGNOSIS — Z00.00 ANNUAL PHYSICAL EXAM: Primary | ICD-10-CM

## 2020-12-10 DIAGNOSIS — L72.9 INFECTED CYST OF SKIN: ICD-10-CM

## 2020-12-10 DIAGNOSIS — E78.5 DYSLIPIDEMIA: ICD-10-CM

## 2020-12-10 PROBLEM — D17.22 LIPOMA OF LEFT UPPER EXTREMITY: Status: RESOLVED | Noted: 2017-05-19 | Resolved: 2020-12-10

## 2020-12-10 PROBLEM — Z12.11 SCREENING FOR COLON CANCER: Status: RESOLVED | Noted: 2018-09-20 | Resolved: 2020-12-10

## 2020-12-10 PROBLEM — E55.9 VITAMIN D DEFICIENCY: Status: RESOLVED | Noted: 2020-12-01 | Resolved: 2020-12-10

## 2020-12-10 PROBLEM — D17.21 LIPOMA OF RIGHT UPPER EXTREMITY: Status: RESOLVED | Noted: 2017-05-19 | Resolved: 2020-12-10

## 2020-12-10 PROBLEM — E78.2 MIXED HYPERLIPIDEMIA: Status: RESOLVED | Noted: 2018-09-20 | Resolved: 2020-12-10

## 2020-12-10 PROBLEM — M25.511 RIGHT SHOULDER PAIN: Status: RESOLVED | Noted: 2017-03-24 | Resolved: 2020-12-10

## 2020-12-10 PROBLEM — Z13.1 SCREENING FOR DIABETES MELLITUS: Status: RESOLVED | Noted: 2020-12-01 | Resolved: 2020-12-10

## 2020-12-10 PROBLEM — R63.4 WEIGHT LOSS: Status: RESOLVED | Noted: 2017-05-19 | Resolved: 2020-12-10

## 2020-12-10 PROCEDURE — 99213 OFFICE O/P EST LOW 20 MIN: CPT | Performed by: FAMILY MEDICINE

## 2020-12-10 PROCEDURE — 3008F BODY MASS INDEX DOCD: CPT | Performed by: FAMILY MEDICINE

## 2020-12-10 PROCEDURE — 1036F TOBACCO NON-USER: CPT | Performed by: FAMILY MEDICINE

## 2021-04-24 ENCOUNTER — OFFICE VISIT (OUTPATIENT)
Dept: URGENT CARE | Facility: CLINIC | Age: 66
End: 2021-04-24
Payer: COMMERCIAL

## 2021-04-24 VITALS
OXYGEN SATURATION: 98 % | HEART RATE: 89 BPM | TEMPERATURE: 97.5 F | HEIGHT: 70 IN | BODY MASS INDEX: 22.62 KG/M2 | SYSTOLIC BLOOD PRESSURE: 170 MMHG | DIASTOLIC BLOOD PRESSURE: 90 MMHG | RESPIRATION RATE: 18 BRPM | WEIGHT: 158 LBS

## 2021-04-24 DIAGNOSIS — L81.9 DISCOLORATION OF SKIN OF FOOT: ICD-10-CM

## 2021-04-24 DIAGNOSIS — R10.9 FLANK PAIN: Primary | ICD-10-CM

## 2021-04-24 PROBLEM — M54.10 RADICULAR PAIN OF UPPER EXTREMITY: Status: ACTIVE | Noted: 2017-11-21

## 2021-04-24 LAB
SL AMB  POCT GLUCOSE, UA: NORMAL
SL AMB LEUKOCYTE ESTERASE,UA: NORMAL
SL AMB POCT BILIRUBIN,UA: NORMAL
SL AMB POCT BLOOD,UA: NORMAL
SL AMB POCT CLARITY,UA: CLEAR
SL AMB POCT COLOR,UA: YELLOW
SL AMB POCT KETONES,UA: NORMAL
SL AMB POCT NITRITE,UA: NORMAL
SL AMB POCT PH,UA: 7.5
SL AMB POCT SPECIFIC GRAVITY,UA: 1.01
SL AMB POCT URINE PROTEIN: NORMAL
SL AMB POCT UROBILINOGEN: 0.2

## 2021-04-24 PROCEDURE — 81002 URINALYSIS NONAUTO W/O SCOPE: CPT | Performed by: PHYSICIAN ASSISTANT

## 2021-04-24 PROCEDURE — 99213 OFFICE O/P EST LOW 20 MIN: CPT | Performed by: PHYSICIAN ASSISTANT

## 2021-04-24 PROCEDURE — 3725F SCREEN DEPRESSION PERFORMED: CPT | Performed by: PHYSICIAN ASSISTANT

## 2021-04-24 NOTE — PROGRESS NOTES
[facility]        NAME: Lorrie Og is a 61 y o  male  : 1955    MRN: [id number]  DATE: 2021  TIME: 2:43 PM    Assessment and Plan   Flank pain [R10 9]  1  Flank pain  POCT urine dip   2  Discoloration of skin of foot       Patient Instructions     Ibuprofen or tylenol if needed for discomfort  Ice/heat for discomfort relief  Keep follow up appt for 2021 with your PCP  To ER with worsening  Chief Complaint     Chief Complaint   Patient presents with    Back Pain     Pt reports of right sided flank pain, denies any urinary s/s and also presents with left foot pain with discoloration w/o any acute injury   Foot Pain     History of Present Illness   62 y/o male with c/o right sided back pain x 1 week  States he awoke with pain of his right mid back one week ago described as aching  Reports to a physically active lifestyle and job and reports he often has back pain  Does not recall a specific injury or cause for pain  Pain is only present with right lateral flexion, with no pain at rest  No bruising, redness, or swelling  No F/C/S, urgency, frequency, dysuria, or hematuria  No hesitancy to void or disruption of stream  Reports hx of [place] 6 prostate cancer and is undergoing quarterly PSA testing for monitoring  Denies any chemotherapy, radiation, or surgical intervention for this dx  No hx of kidney stone, infection, or disorder  Also complains of purple to brown discoloration of the left foot noted this morning  Reports tenderness to touch and with ambulation this morning, now resolved  No swelling, redness, warmth, mass formation, or known injury  No prior occurrence  Review of Systems   Review of Systems   Constitutional: Negative for chills, diaphoresis and fever  Respiratory: Negative for cough, shortness of breath and wheezing  Cardiovascular: Negative for chest pain  Gastrointestinal: Negative for abdominal pain, diarrhea, nausea and vomiting  Genitourinary: Negative for discharge, dysuria, frequency, hematuria, penile pain, penile swelling, scrotal swelling, testicular pain and urgency  Musculoskeletal: Positive for back pain  Skin: Negative for color change  Neurological: Negative for dizziness and headaches  Current Medications       Current Outpatient Medications:     Ascorbic Acid (VITAMIN C ER) 1000 MG TBCR, Take by mouth daily, Disp: , Rfl:     aspirin (ECOTRIN LOW STRENGTH) 81 mg EC tablet, Take 81 mg by mouth daily, Disp: , Rfl:     Cholecalciferol (VITAMIN D-3) 5000 units TABS, Take 5,000 Units by mouth daily, Disp: , Rfl:     Chelsie, Zingiber officinalis, (CHELSIE PO), Take by mouth, Disp: , Rfl:     Green Tea, Coby sinensis, (GREEN TEA EXTRACT PO), Take by mouth daily, Disp: , Rfl:     VITAMIN K PO, Take by mouth, Disp: , Rfl:     Current Allergies     Allergies as of 04/24/2021    (No Known Allergies)            The following portions of the patient's history were reviewed and updated as appropriate: allergies, current medications, past family history, past medical history, past social history, past surgical history and problem list      Past Medical History:   Diagnosis Date    Cancer [facility])     prostate cancer [place] 6    Dysuria     last assessed 7/30/14    Elevated PSA     resolved 3/24/17    Hx of removal of neck cyst     last assessed 9/8/15    Prostate cancer [facility])        History reviewed  No pertinent surgical history  Family History   Problem Relation Age of Onset    Leukemia Mother     Multiple sclerosis Mother     Multiple sclerosis Sister          Medications have been verified  Objective   /90   Pulse 89   Temp 97 5 °F (36 4 °C)   Resp 18   Ht 5' 10" (1 778 m)   Wt 71 7 kg (158 lb)   SpO2 98%   BMI 22 67 kg/m²   No LMP for male patient  Physical Exam     Physical Exam  Vitals signs and nursing note reviewed  Constitutional:       General: He is not in acute distress  Appearance: He is well-developed  He is not ill-appearing or diaphoretic  HENT:      Head: Normocephalic and atraumatic  Eyes:      General: Lids are normal       Conjunctiva/sclera: Conjunctivae normal    Cardiovascular:      Rate and Rhythm: Normal rate and regular rhythm  Pulmonary:      Effort: Pulmonary effort is normal  No respiratory distress  Breath sounds: Normal breath sounds  No decreased breath sounds, wheezing, rhonchi or rales  Abdominal:      General: Bowel sounds are normal  There is no distension  Palpations: Abdomen is soft  Abdomen is not rigid  There is no mass  Tenderness: There is no abdominal tenderness  There is no right CVA tenderness, left CVA tenderness, guarding or rebound  Musculoskeletal:        Arms:         Feet:       Comments: Tenderness to palpation of the inferior aspect of the right lateral rib cage and lower thoracic back muscles  No palpable spasm  No midline tenderness  Pain reproducible with right lateral flexion but not with other movements of trunk  No overlying skin abnormality or deformity of this area or abdomen  Feet:      Comments: Purple to brown discoloration of dorsal foot  No edema, warmth, deformity, or disruption of skin integrity  NTTP  FROM of ankle and digits  Capillary refill <3 sec, DP pulse 2+  Skin:     General: Skin is warm and dry  Neurological:      General: No focal deficit present  Mental Status: He is alert  He is not disoriented  Cranial Nerves: Cranial nerves are intact  Coordination: Coordination normal       Gait: Gait normal    Psychiatric:         Behavior: Behavior normal  Behavior is cooperative  Thought Content:  Thought content normal          Judgment: Judgment normal

## 2021-04-26 ENCOUNTER — OFFICE VISIT (OUTPATIENT)
Dept: FAMILY MEDICINE CLINIC | Facility: CLINIC | Age: 66
End: 2021-04-26
Payer: COMMERCIAL

## 2021-04-26 VITALS
BODY MASS INDEX: 23.15 KG/M2 | WEIGHT: 161.7 LBS | RESPIRATION RATE: 18 BRPM | OXYGEN SATURATION: 97 % | DIASTOLIC BLOOD PRESSURE: 80 MMHG | HEART RATE: 74 BPM | HEIGHT: 70 IN | SYSTOLIC BLOOD PRESSURE: 110 MMHG | TEMPERATURE: 97.1 F

## 2021-04-26 DIAGNOSIS — E55.9 VITAMIN D DEFICIENCY: ICD-10-CM

## 2021-04-26 DIAGNOSIS — C61 PROSTATE CANCER (HCC): ICD-10-CM

## 2021-04-26 DIAGNOSIS — E78.5 DYSLIPIDEMIA: ICD-10-CM

## 2021-04-26 DIAGNOSIS — Z11.59 NEED FOR HEPATITIS C SCREENING TEST: ICD-10-CM

## 2021-04-26 DIAGNOSIS — Z11.4 SCREENING FOR HIV (HUMAN IMMUNODEFICIENCY VIRUS): Primary | ICD-10-CM

## 2021-04-26 DIAGNOSIS — M79.18 MYOFASCIAL PAIN: ICD-10-CM

## 2021-04-26 PROCEDURE — 1036F TOBACCO NON-USER: CPT | Performed by: FAMILY MEDICINE

## 2021-04-26 PROCEDURE — 99214 OFFICE O/P EST MOD 30 MIN: CPT | Performed by: FAMILY MEDICINE

## 2021-04-26 PROCEDURE — 3008F BODY MASS INDEX DOCD: CPT | Performed by: FAMILY MEDICINE

## 2021-04-26 RX ORDER — ATORVASTATIN CALCIUM 40 MG/1
40 TABLET, FILM COATED ORAL DAILY
Qty: 90 TABLET | Refills: 3 | Status: CANCELLED | OUTPATIENT
Start: 2021-04-26

## 2021-04-26 RX ORDER — TIZANIDINE HYDROCHLORIDE 2 MG/1
2 CAPSULE, GELATIN COATED ORAL
Qty: 30 CAPSULE | Refills: 0 | Status: SHIPPED | OUTPATIENT
Start: 2021-04-26

## 2021-04-26 NOTE — PROGRESS NOTES
Assessment/Plan:      Diagnoses and all orders for this visit:    Screening for HIV (human immunodeficiency virus)  -     HIV 1/2 Antigen/Antibody (4th Generation) w Reflex [facility]; Future    Need for hepatitis C screening test  -     Hepatitis C antibody; Future    Dyslipidemia  -     Lipid Panel with Direct LDL reflex; Future    Vitamin D deficiency  -     Vitamin D 25 hydroxy; Future    Prostate cancer [facility])  -     Ambulatory referral to Hematology / Oncology; Future    Myofascial pain  -     TiZANidine (ZANAFLEX) 2 MG capsule; Take 1 capsule (2 mg total) by mouth daily at bedtime as needed for muscle spasms    Other orders  -     Cancel: atorvastatin (LIPITOR) 40 mg tablet; Take 1 tablet (40 mg total) by mouth daily        Patient advised that this is likely myofascial pain and he is given prescription for  Tizanidine today  If this does not work for him he will call back and will set up appointment with Dr Nilesh Jauregui for dry needling over the MM physician  Patient also strongly advised that hip pain can be referred from the groin  Currently he only sees a diagnostic radiologist for his surveillance versus prostate cancer  Advised patient today that he should be seen by oncologist locally as well  For surveillance and blood work  He is amenable to this, and referral was given today  He is also advised that he should follow-up with urology     at this time patient would not like to start medication for his dyslipidemia  He repeat lipid panel given today, if that continues to be elevated he will consider starting high-intensity statin  Subjective:     Patient ID: Yue Urena is a 61 y o  male  With a past medical history of prostate cancer and dyslipidemia  He is being seen today for acute back pain  Back pain started approximately 2 weeks ago of insidious onset  Does not recall any injury to the area  No history of surgeries to the hip her low abdomen or back  No history of trauma     He states that the pain is mostly on his right flank close to his hip and radiates in the C like pattern to the anterior hip location  History of the same not moved  Does not worsen with movement  Pain is a 5/10 and he has not tried taking any medications for it  Patient also history of prostate cancer  He follows with   Diagnostic Radiology at only at this time has been told that he has a Joe 6 score  and is managed with surveillance only  He has not wanted to see oncologist in the past as he is happy with his diagnostic radiologist for his prostate cancer care  He was given referral to see Dr Marcos Reynoso with urology in the past   No recent notes are noted in EMR for urology visit  Review of Systems   All other systems reviewed and are negative  Objective:     Physical Exam  Musculoskeletal:      Comments: Right-sided tenderness on palpation noted approximately 8 cm laterally from the L4/L5 spine       normal range of movement of hip flexors,  Normal extension, flexion, rotation at the lumbar area   negative lumbar facet loading

## 2021-04-26 NOTE — PATIENT INSTRUCTIONS
Mediterranean Diet   WHAT YOU NEED TO KNOW:   What is a Mediterranean diet? A Mediterranean diet is a meal plan that includes foods that are commonly eaten in countries that border the [place]  This meal plan may provide several health benefits  These include losing or maintaining weight, and decreasing blood pressure, blood sugar, and cholesterol levels  It may also help protect against certain health conditions such as heart disease, cancer, type 2 diabetes, and Alzheimer disease  Work with a dietitian to develop a meal plan that is right for you  What foods are included in the [address]? · Include fruits and vegetables in each meal   Eat a variety of fresh fruits and vegetables  · Choose whole grains every day  These foods include whole-grain breads, pastas, and cereals  It also includes brown rice, quinoa, and millet  · Use unsaturated fats instead of saturated fats  Cook with olive or canola oil  Limit saturated fats, such as butter, margarine, and shortening  Saturated fat is an unhealthy fat that can increase your cholesterol levels  · Choose plant foods, poultry, and fish as your main sources of protein  ? Eat plant-based foods that provide protein,  such as lentils, beans, chickpeas, nuts, and seeds  Choose mostly plant-based foods in place of meat on most days of the week  ? Eat protein foods high in omega-3 fats  Fish high in omega-3 fats include salmon, trout, and tuna  Include these types of fish 1 or 2 times each week  Limit fish high in mercury, such as shark, swordfish, tilefish, and kalyani mackerel  Omega-3 fats are also found in walnuts and flaxseed  ? Choose poultry (chicken or turkey)  without skin instead of red meat  Red meat is high in saturated fat  Limit eggs and high-fat meats, such as long, sausage, and hot dogs  · Choose low-fat dairy foods  such as nonfat or 1% milk, or low-fat almond, cashew, or soy milk   Other examples include low-fat cheese, yogurt, and cottage cheese  · Limit sweets  Limit your intake of high-sugar foods, such as soda, desserts, and candy  · Talk to your healthcare provider about alcohol  Studies have shown that moderate intake of wine may reduce the risk of heart disease  A moderate amount of wine is 1 serving for women and men 65 years and older each day  Two servings is recommended for men 24to 59years of age each day  A serving of wine is 5 ounces  What else do I need to know if I follow the Mediterranean diet? · Include foods high in iron and vitamin C   Plant-based foods that are high in iron include spinach, beans, tofu, and artichoke  Eat a serving of vitamin C with any iron-rich food to help your body absorb more iron  Examples include oranges, strawberries, cantaloupe, broccoli, and yellow peppers  · Get regular physical activity  The Mediterranean diet will have the most benefit if you get regular physical activity  Get 30 minutes of physical activity at least 5 days a week  Choose physical activities that increase your heart rate  Examples include walking, hiking, swimming, and riding a bike  Ask your healthcare provider about the best exercise plan for you  CARE AGREEMENT:   You have the right to help plan your care  Discuss treatment options with your healthcare provider to decide what care you want to receive  You always have the right to refuse treatment  The above information is an  only  It is not intended as medical advice for individual conditions or treatments  Talk to your doctor, nurse or pharmacist before following any medical regimen to see if it is safe and effective for you  © Copyright [address] Information is for End User's use only and may not be sold, redistributed or otherwise used for commercial purposes   All illustrations and images included in CareNotes® are the copyrighted property of A D A M , Inc  or [employer]

## 2021-04-28 ENCOUNTER — TELEPHONE (OUTPATIENT)
Dept: SURGICAL ONCOLOGY | Facility: CLINIC | Age: 66
End: 2021-04-28

## 2021-04-28 NOTE — TELEPHONE ENCOUNTER
Referral was placed for pt to see a medical oncologist, pt did not want to schedule at the moment  Phone number was given

## 2021-05-02 LAB
25(OH)D3+25(OH)D2 SERPL-MCNC: 63.3 NG/ML (ref 30–100)
CHOLEST SERPL-MCNC: 216 MG/DL (ref 100–199)
HCV AB S/CO SERPL IA: <0.1 S/CO RATIO (ref 0–0.9)
HDLC SERPL-MCNC: 56 MG/DL
HIV 1+2 AB+HIV1 P24 AG SERPL QL IA: NON REACTIVE
LDLC SERPL CALC-MCNC: 143 MG/DL (ref 0–99)
TRIGL SERPL-MCNC: 95 MG/DL (ref 0–149)

## 2021-05-14 ENCOUNTER — TELEPHONE (OUTPATIENT)
Dept: FAMILY MEDICINE CLINIC | Facility: CLINIC | Age: 66
End: 2021-05-14

## 2021-05-14 DIAGNOSIS — L72.9 INFECTED CYST OF SKIN: Primary | ICD-10-CM

## 2021-05-14 DIAGNOSIS — L08.9 INFECTED CYST OF SKIN: Primary | ICD-10-CM

## 2021-05-14 DIAGNOSIS — C61 PROSTATE CANCER (HCC): ICD-10-CM

## 2021-05-14 NOTE — TELEPHONE ENCOUNTER
patient stopped in requesting a new referral to urology     Please call patient when ready     appointment is 5/19/2021

## 2021-09-13 ENCOUNTER — TELEPHONE (OUTPATIENT)
Dept: FAMILY MEDICINE CLINIC | Facility: CLINIC | Age: 66
End: 2021-09-13

## 2021-09-13 NOTE — TELEPHONE ENCOUNTER
Patient requesting a new order for urology -urology told him the one from   2021 is     Has appointment tomorrow afternoon

## 2021-11-03 ENCOUNTER — OFFICE VISIT (OUTPATIENT)
Dept: FAMILY MEDICINE CLINIC | Facility: CLINIC | Age: 66
End: 2021-11-03
Payer: COMMERCIAL

## 2021-11-03 VITALS
DIASTOLIC BLOOD PRESSURE: 86 MMHG | OXYGEN SATURATION: 98 % | HEIGHT: 70 IN | RESPIRATION RATE: 18 BRPM | WEIGHT: 160.2 LBS | SYSTOLIC BLOOD PRESSURE: 152 MMHG | BODY MASS INDEX: 22.94 KG/M2 | HEART RATE: 90 BPM | TEMPERATURE: 97.3 F

## 2021-11-03 DIAGNOSIS — L72.3 SEBACEOUS CYST: Primary | ICD-10-CM

## 2021-11-03 PROCEDURE — 99213 OFFICE O/P EST LOW 20 MIN: CPT | Performed by: FAMILY MEDICINE

## 2021-11-05 ENCOUNTER — PROCEDURE VISIT (OUTPATIENT)
Dept: FAMILY MEDICINE CLINIC | Facility: CLINIC | Age: 66
End: 2021-11-05
Payer: COMMERCIAL

## 2021-11-05 VITALS
SYSTOLIC BLOOD PRESSURE: 164 MMHG | OXYGEN SATURATION: 98 % | DIASTOLIC BLOOD PRESSURE: 92 MMHG | BODY MASS INDEX: 23.05 KG/M2 | WEIGHT: 161 LBS | TEMPERATURE: 98.7 F | RESPIRATION RATE: 18 BRPM | HEIGHT: 70 IN | HEART RATE: 66 BPM

## 2021-11-05 DIAGNOSIS — L72.3 INFECTED SEBACEOUS CYST: Primary | ICD-10-CM

## 2021-11-05 DIAGNOSIS — L08.9 INFECTED SEBACEOUS CYST: Primary | ICD-10-CM

## 2021-11-05 PROCEDURE — 10060 I&D ABSCESS SIMPLE/SINGLE: CPT | Performed by: FAMILY MEDICINE

## 2021-11-12 ENCOUNTER — OFFICE VISIT (OUTPATIENT)
Dept: FAMILY MEDICINE CLINIC | Facility: CLINIC | Age: 66
End: 2021-11-12
Payer: COMMERCIAL

## 2021-11-12 VITALS
HEIGHT: 70 IN | WEIGHT: 160.2 LBS | TEMPERATURE: 97.6 F | SYSTOLIC BLOOD PRESSURE: 170 MMHG | BODY MASS INDEX: 22.94 KG/M2 | HEART RATE: 78 BPM | DIASTOLIC BLOOD PRESSURE: 90 MMHG | OXYGEN SATURATION: 97 % | RESPIRATION RATE: 18 BRPM

## 2021-11-12 DIAGNOSIS — Z09 FOLLOW-UP EXAM AFTER TREATMENT: ICD-10-CM

## 2021-11-12 DIAGNOSIS — R03.0 ELEVATED BLOOD PRESSURE READING IN OFFICE WITHOUT DIAGNOSIS OF HYPERTENSION: Primary | ICD-10-CM

## 2021-11-12 PROCEDURE — 1036F TOBACCO NON-USER: CPT | Performed by: FAMILY MEDICINE

## 2021-11-12 PROCEDURE — 99214 OFFICE O/P EST MOD 30 MIN: CPT | Performed by: FAMILY MEDICINE

## 2021-11-12 PROCEDURE — 3008F BODY MASS INDEX DOCD: CPT | Performed by: FAMILY MEDICINE

## 2021-11-23 ENCOUNTER — TELEPHONE (OUTPATIENT)
Dept: FAMILY MEDICINE CLINIC | Facility: CLINIC | Age: 66
End: 2021-11-23

## 2021-12-10 ENCOUNTER — OFFICE VISIT (OUTPATIENT)
Dept: FAMILY MEDICINE CLINIC | Facility: CLINIC | Age: 66
End: 2021-12-10
Payer: COMMERCIAL

## 2021-12-10 ENCOUNTER — TELEPHONE (OUTPATIENT)
Dept: FAMILY MEDICINE CLINIC | Facility: CLINIC | Age: 66
End: 2021-12-10

## 2021-12-10 VITALS
SYSTOLIC BLOOD PRESSURE: 158 MMHG | HEART RATE: 81 BPM | RESPIRATION RATE: 18 BRPM | OXYGEN SATURATION: 97 % | DIASTOLIC BLOOD PRESSURE: 96 MMHG | HEIGHT: 70 IN | TEMPERATURE: 97 F | WEIGHT: 159 LBS | BODY MASS INDEX: 22.76 KG/M2

## 2021-12-10 DIAGNOSIS — C61 PROSTATE CANCER (HCC): ICD-10-CM

## 2021-12-10 DIAGNOSIS — Z12.11 SCREENING FOR COLORECTAL CANCER: Primary | ICD-10-CM

## 2021-12-10 DIAGNOSIS — Z12.12 SCREENING FOR COLORECTAL CANCER: Primary | ICD-10-CM

## 2021-12-10 DIAGNOSIS — I10 PRIMARY HYPERTENSION: ICD-10-CM

## 2021-12-10 DIAGNOSIS — Z00.00 ANNUAL PHYSICAL EXAM: ICD-10-CM

## 2021-12-10 PROCEDURE — 99396 PREV VISIT EST AGE 40-64: CPT | Performed by: FAMILY MEDICINE

## 2021-12-10 PROCEDURE — 3008F BODY MASS INDEX DOCD: CPT | Performed by: FAMILY MEDICINE

## 2021-12-10 PROCEDURE — 1036F TOBACCO NON-USER: CPT | Performed by: FAMILY MEDICINE

## 2021-12-10 RX ORDER — MULTIVITAMIN
1 TABLET ORAL DAILY
COMMUNITY

## 2021-12-10 RX ORDER — ZINC GLUCONATE 50 MG
15 TABLET ORAL DAILY
COMMUNITY

## 2021-12-14 ENCOUNTER — TELEPHONE (OUTPATIENT)
Dept: HEMATOLOGY ONCOLOGY | Facility: CLINIC | Age: 66
End: 2021-12-14

## 2021-12-14 ENCOUNTER — DOCUMENTATION (OUTPATIENT)
Dept: HEMATOLOGY ONCOLOGY | Facility: CLINIC | Age: 66
End: 2021-12-14

## 2022-01-11 ENCOUNTER — TELEPHONE (OUTPATIENT)
Dept: HEMATOLOGY ONCOLOGY | Facility: CLINIC | Age: 67
End: 2022-01-11

## 2022-01-11 NOTE — TELEPHONE ENCOUNTER
Appointment Cancellation Or Reschedule     Person calling in Patient    Provider Dr Jose Woods   Office Visit Date and Time 01/17/2022 @ 11:20am   Office Visit Location [place]   Did patient want to reschedule their office appointment? If so, when was it scheduled to? no   Is this patient calling to reschedule an infusion appointment? no   When is their next infusion appointment? no   Is this patient a Chemo patient? no   Reason for Cancellation or Reschedule Patient waiting for new insurance     If the patient is a treatment patient, please route this to the office nurse  If the patient is not on treatment, please route to the office MA

## 2022-01-12 ENCOUNTER — DOCUMENTATION (OUTPATIENT)
Dept: HEMATOLOGY ONCOLOGY | Facility: CLINIC | Age: 67
End: 2022-01-12

## 2022-01-12 NOTE — PROGRESS NOTES
Date pathology slides received and sent to pathology department: 12/23/21     Slides received from: Andre Cope 4 were sent via: Interoffice           Slides sent attention to: Abby     Message sent to team: Yes        Date records received: 12/21/21     Received from: Dr Martir Garcia office and [facility]     Date faxed to [address]: 12/21/21        I called [facility] at least 5 times and left messages everytime  Called again on 1/11/22 telling them I need these disks asap because patient is being seen on 1/17/22  I was given an email to the medical records person her name is [facility]  I sent an email to [email or web address] on 1/11/22  I am still waiting to hear back from her  Palliative Care Initial Note  Palliative Care Admit date:    Advance Directives:DNR-CC    Plan of care/goals: Hospice    Social/Spiritual: Prayer Support    Plan: writer met with the spouse at bedside.  in with the pt at this time and bedside nurse is here to transport pt to palliative care room. Hospice list provided to the spouse and writer will f/u once family is settled in rm [address].     Reason for consult:    ___ Advance Care Planning  __x_ Transition of Care Planning  ___ Psychosocial/Spiritual Support  __x_ Symptom Management    [address]

## 2022-01-13 ENCOUNTER — DOCUMENTATION (OUTPATIENT)
Dept: HEMATOLOGY ONCOLOGY | Facility: CLINIC | Age: 67
End: 2022-01-13

## 2022-01-13 NOTE — PROGRESS NOTES
I have yet to receive an email back from Juliana Powers with [facility] about copies of patients disks and reports  I called [facility] and spoke with manager, Saurav Kirk  She will be mailing out disks today  They do not have [employer] account so this will be mailed USPS  She will be faxing reports now  I will send these to right fax asap  Images may not be available for appt on 1/17/22

## 2022-01-14 ENCOUNTER — TELEPHONE (OUTPATIENT)
Dept: HEMATOLOGY ONCOLOGY | Facility: MEDICAL CENTER | Age: 67
End: 2022-01-14

## 2022-01-14 NOTE — TELEPHONE ENCOUNTER
Attempted to reach out to patient to discuss his previous call in regards to canceling appointment due to waiting on insurance cards  Went to [employer] for [employer], no contest form since patient is new to practice  Will attempt to call home number provided

## 2022-01-14 NOTE — TELEPHONE ENCOUNTER
Attempted to reach out to patient to discuss his previous call in regards to canceling appointment due to waiting on insurance cards  Went to [employer] for [employer], no contest form since patient is new to practice  Will attempt to call home number provided

## 2022-01-14 NOTE — TELEPHONE ENCOUNTER
Reached patient in regards to requested to cancel his appt 1/17  Patient stated he wished to wait until he had active medicare, he is currently on wifes plan is has high deductible and does not want to pay  I offered to reach out to finance to see what we could come up with as far as possibly back dating once medicare starts  Patient refused, stated it was easier and less of a hassle for him to just wait a few weeks until he was set up with it  I rescheduled patient until 2/7 as I told him I did not want slots to be taking up and he be pushed back further  Patient agreed  I also let patient know I would be reaching out to [facility] who was handling his MRI requests  Teams message sent over to melisa cortez  I asked several times if I could reach out to finance on patient behalf to make sure he could still come in on 1/17 so he didn't have to wait  Patient refused each time stating he did not want to deal with the headache and was just easier for him to wait  Patient rescheduled appt 2/7 320  Patient aware and agreeable to this date         Lasandra Lefort

## 2022-01-20 ENCOUNTER — DOCUMENTATION (OUTPATIENT)
Dept: HEMATOLOGY ONCOLOGY | Facility: CLINIC | Age: 67
End: 2022-01-20

## 2022-01-20 NOTE — PROGRESS NOTES
Date disk received and sent to radiology department: 1/20/22    Disks received from: [facility]    Disks sent via: interoffice    Disks sent to: [facility] reading room    Message sent to team: Yes

## 2022-02-07 ENCOUNTER — CONSULT (OUTPATIENT)
Dept: HEMATOLOGY ONCOLOGY | Facility: MEDICAL CENTER | Age: 67
End: 2022-02-07
Payer: COMMERCIAL

## 2022-02-07 VITALS
DIASTOLIC BLOOD PRESSURE: 98 MMHG | SYSTOLIC BLOOD PRESSURE: 160 MMHG | HEIGHT: 70 IN | RESPIRATION RATE: 16 BRPM | OXYGEN SATURATION: 98 % | HEART RATE: 88 BPM | WEIGHT: 164.6 LBS | TEMPERATURE: 97.6 F | BODY MASS INDEX: 23.56 KG/M2

## 2022-02-07 DIAGNOSIS — C61 PROSTATE CANCER (HCC): ICD-10-CM

## 2022-02-07 PROCEDURE — 99245 OFF/OP CONSLTJ NEW/EST HI 55: CPT | Performed by: INTERNAL MEDICINE

## 2022-02-07 NOTE — PROGRESS NOTES
Ferna Felty  1955  [facility] [address] [address]  HEMATOLOGY/ONCOLOGY CONSULTATION REPORT    DISCUSSION/SUMMARY:    61-year-old male diagnosed with prostate adenocarcinoma (1+ core, Jeo score of 6) in 2015  The plan since 2015 has been surveillance  Mr Nessa Hurd feels well and clinically there are no concerning signs  Patient has no  issues  We discussed options  Patient states that he is not very keen on repeating another biopsy at this time  The plan is to repeat the bpMRI of the prostate (without contrast), repeat the PSA level and also have a CBC/differential and CMP checked  Patient will have Dr Mcdermott tabatha [facility]) read the [address] (as before)  Those results along with the pending laboratory test results will help dictate further options  We also discussed the possibility of a gallium 68 PSMA-11 PET scan - this is on hold for the time being (no clinical evidence for locally advanced or metastatic disease)  Patient is to return in 3 months but this may change depending upon the above  Mr Nessa Hurd knows to call the hematology/oncology office if there are any other questions or concerns  Carefully review your medication list and verify that the list is accurate and up-to-date  Please call the hematology/oncology office if there are medications missing from the list, medications on the list that you are not currently taking or if there is a dosage or instruction that is different from how you're taking that medication      Patient goals and areas of care: bpMRI prostate, repeat PSA level and other blood work  Barriers to care:  None  Patient is able to self-care   ______________________________________________________________________________________    Chief Complaint   Patient presents with    Consult     History of prostate cancer     History of Present Illness:  61-year-old male with a somewhat complicated prostate cancer history referred for oncology evaluation  Mr Ly Dunaway was diagnosed with a urinary tract infection in 2015  At that time patient was found to have an elevated PSA  Patient received antibiotics and the UTI resolved  Patient was subsequently followed by Urology; PSA remained elevated  Patient underwent a 12 core biopsy  Reportedly 1 of the 12 cores demonstrated Joe 6 adenocarcinoma  There was no evidence of locally advanced or metastatic disease - patient started surveillance  Patient states that his PSA initially was in the 3-4 range but more recently has been in the 8-10 range  Mr Ly Dunaway has been followed by Dr Deep Nunez; patient has also been followed by a urologist in [place], Dr Jonelle Malhotra  Patient underwent a 2nd biopsy approximately 3 years ago  Mr Ly Dunaway states that the 2nd pathology results were about the same as before, no evidence of disease progression  The plan up to this point has been repeating the biparamedic MRI without contrast   The scans are read by Dr Kam Griffin in the [place]; patient either then has a direct appointment with Dr Kam Griffin or patient has a tele visit  As above, patient also follows with Dr Deep Nunez  As per patient, recently there has been a difference of opinion as far as repeating a prostate biopsy (or not)  Presently patient states feeling okay, baseline  No  issues, hematuria, dysuria or frequency  Appetite is good, weight is stable  Activities are baseline  No bone pain or other pain control issues  No headaches, blurred vision or dizziness  No shortness of breath or dyspnea on exertion  No problems with excessive bruising or bleeding  Patient states that routine health maintenance and medical care is for the most part up-to-date  Patient did not get the COVID vaccine - Mr Torres states that he has not had a vaccine in 40 years  Review of Systems   Constitutional: Negative      HENT: Negative  Eyes: Negative  Respiratory: Negative  Cardiovascular: Negative  Gastrointestinal: Negative  Endocrine: Negative  Genitourinary: Negative  Musculoskeletal: Negative  Skin: Negative  Allergic/Immunologic: Negative  Neurological: Negative  Hematological: Negative  Psychiatric/Behavioral: Negative  All other systems reviewed and are negative      Patient Active Problem List   Diagnosis    Acrochordon    Hypercholesteremia    Prostate cancer ([facility] [address] )    Annual physical exam    Radicular pain of upper extremity    Primary hypertension     Past Medical History:   Diagnosis Date    Cancer [facility])     prostate cancer zoey 6    Dysuria     last assessed 7/30/14    Elevated PSA     resolved 3/24/17    Hx of removal of neck cyst     last assessed 9/8/15    Prostate cancer [facility])      Past surgical history:  No prior blood transfusions    Family History   Problem Relation Age of Onset    Leukemia Mother     Multiple sclerosis Mother     Multiple sclerosis Sister    Family history:  No known familial or genetic diseases including prostate cancer    Social History     Socioeconomic History    Marital status: /Civil Union     Spouse name: Not on file    Number of children: Not on file    Years of education: Not on file    Highest education level: Not on file   Occupational History    Not on file   Tobacco Use    Smoking status: Former Smoker    Smokeless tobacco: Never Used   Substance and Sexual Activity    Alcohol use: No    Drug use: No    Sexual activity: Not on file   Other Topics Concern    Not on file   Social History Narrative    Not on file     Social Determinants of Health     Financial Resource Strain: Not on file   Food Insecurity: Not on file   Transportation Needs: Not on file   Physical Activity: Not on file   Stress: Not on file   Social Connections: Not on file   Intimate Partner Violence: Not on file   Housing Stability: Not on file Social history:  No tobacco, alcohol or drug abuse, no toxic exposure    Current Outpatient Medications:     Ascorbic Acid (VITAMIN C ER) 1000 MG TBCR, Take 1,260 mg by mouth daily Liposomal Vit C  , Disp: , Rfl:     aspirin (ECOTRIN LOW STRENGTH) 81 mg EC tablet, Take 81 mg by mouth 2 (two) times a week  , Disp: , Rfl:     Cholecalciferol (VITAMIN D-3) 5000 units TABS, Take 11,000 Units by mouth daily  , Disp: , Rfl:     Ginger, Zingiber officinalis, (GINGER PO), Take by mouth, Disp: , Rfl:     Green Tea, Coby sinensis, (GREEN TEA EXTRACT PO), Take by mouth daily, Disp: , Rfl:     Multiple Vitamin (multivitamin) tablet, Take 1 tablet by mouth daily, Disp: , Rfl:     other medication, see sig,, Medication/product name: Quersetin   Strength: 500mg Sig (include dose, route, frequency): QD PO, Disp: , Rfl:     VITAMIN K PO, Take 400 mg by mouth  , Disp: , Rfl:     zinc gluconate 50 mg tablet, Take 15 mg by mouth daily, Disp: , Rfl:     Ascorbic Acid (VITAMIN C PO), Take by mouth 1260mg daily (Patient not taking: Reported on 11/12/2021 ), Disp: , Rfl:     TiZANidine (ZANAFLEX) 2 MG capsule, Take 1 capsule (2 mg total) by mouth daily at bedtime as needed for muscle spasms (Patient not taking: Reported on 11/5/2021), Disp: 30 capsule, Rfl: 0    No Known Allergies    Vitals:    02/07/22 1516   BP: 160/98   Pulse: 88   Resp: 16   Temp: 97 6 °F (36 4 °C)   SpO2: 98%     Physical Exam  Constitutional:       Appearance: He is well-developed  Comments: Well-nourished middle-aged male, no respiratory distress, no signs of pain   HENT:      Head: Normocephalic and atraumatic  Right Ear: External ear normal       Left Ear: External ear normal    Eyes:      Conjunctiva/sclera: Conjunctivae normal       Pupils: Pupils are equal, round, and reactive to light  Cardiovascular:      Rate and Rhythm: Normal rate and regular rhythm  Heart sounds: Normal heart sounds     Pulmonary:      Effort: Pulmonary effort is normal       Breath sounds: Normal breath sounds  Abdominal:      General: Bowel sounds are normal       Palpations: Abdomen is soft  Comments: Soft, nontender, +bowel sounds, cannot palpate liver or spleen, no guarding, no rigidity or rebound   Musculoskeletal:         General: Normal range of motion  Cervical back: Normal range of motion and neck supple  Skin:     General: Skin is warm  Comments: Good color, warm, moist, no petechiae or ecchymoses   Neurological:      Mental Status: He is alert and oriented to person, place, and time  Deep Tendon Reflexes: Reflexes are normal and symmetric  Psychiatric:         Behavior: Behavior normal          Thought Content: Thought content normal          Judgment: Judgment normal      Extremities:  No lower extremity edema bilaterally, no cords, pulses are 1+ bilaterally  Lymphatics:  No adenopathy in the neck, supraclavicular region, axilla and groin bilaterally    Labs    09/28/2021 4 K score = 31% PSA = 10 43    Imaging    02/25/2021 [facility] MRI prostate without contrast   Impression stated MRI negative for significant or index lesion, indeterminate or high-grade neoplastic disease  Negative for concordant target lesion  Negative for neurovascular bundle involvement, extracapsular extension, seminal vesicle extension, pathologic lymphadenopathy or bone metastases  Transitional zone demonstrates an enlarged median lobe and BPH nodules in an enlarged gland  Peripheral zone negative for concurrent focal abnormality with bilateral, symmetrical, homogeneous decrease signal intensity compatible with chronic inflammation or prostatitis  Recommend continued surveillance with repeat PSA at 6 and 12 months, recommend repeat bpMRI in 12 months  Final impression stated PI-RADS category 2, clinically significant cancer is unlikely to be present      Pathology    Case Report   Surgical Pathology Report                         Case: [id number]                                    Authorizing Provider: Laura De La Cruz MD           Collected:           12/29/2021 0706               Ordering Location:     [facility]      Received:            12/29/2021 [facility]                                      [facility]                                                                                   Laboratory                                                                    Pathologist:           Talon Young MD                                                    Specimen:    Prostate, Prostate Biopsy ( 27 slides [id number] [employer], collected                        9/12/2017)                                                                                 Final Diagnosis   Prostate, Prostate Biopsy ( 27 slides [id number] [employer], collected 9/12/2017):     A  LEFT APEX, NEEDLE BIOPSY:  - Prostatic adenocarcinoma, acinar type, [place] score 3 + 3 = 6, Prognostic Grade Group 1, continuously involving 2 of 2 cores (10%, 5%)  - Perineural invasion: Not identified      B  LEFT MID, NEEDLE BIOPSY:  - Benign prostate tissue      C  LEFT BASE, NEEDLE BIOPSY:  - Benign prostate tissue      D  LEFT LATERAL APEX, NEEDLE BIOPSY:  - Prostatic adenocarcinoma, acinar type, Joe score 3 + 3 = 6, Prognostic Grade Group 1, continuously involving 1 of 2 cores (5%, 0%)  - Perineural invasion: Not identified      E  LEFT LATERAL MED, NEEDLE BIOPSY:  - Benign prostate tissue      F  LEFT LATERAL BASE, NEEDLE BIOPSY:  - Benign prostate tissue      G  RIGHT APEX, NEEDLE BIOPSY:  - Benign prostate tissue      H  RIGHT MID, NEEDLE BIOPSY:  - Benign prostate tissue      I  RIGHT BASE, NEEDLE BIOPSY:  - Benign prostate tissue      J   RIGHT LATERAL APEX, NEEDLE BIOPSY:  - Benign prostate tissue      K  RIGHT LATERAL MID, NEEDLE BIOPSY:  - Benign prostate tissue      L  RIGHT LATERAL BASE, NEEDLE BIOPSY  - Benign prostate tissue     Electronically signed by Kenan Dyer MD on 12/29/2021 at 11:15 AM

## 2022-03-10 LAB
ALBUMIN SERPL-MCNC: 4.2 G/DL (ref 3.8–4.8)
ALBUMIN/GLOB SERPL: 1.5 {RATIO} (ref 1.2–2.2)
ALP SERPL-CCNC: 72 IU/L (ref 44–121)
ALT SERPL-CCNC: 22 IU/L (ref 0–44)
AST SERPL-CCNC: 21 IU/L (ref 0–40)
BASOPHILS # BLD AUTO: 0.1 X10E3/UL (ref 0–0.2)
BASOPHILS NFR BLD AUTO: 1 %
BILIRUB SERPL-MCNC: 0.4 MG/DL (ref 0–1.2)
BUN SERPL-MCNC: 23 MG/DL (ref 8–27)
BUN/CREAT SERPL: 25 (ref 10–24)
CALCIUM SERPL-MCNC: 9.9 MG/DL (ref 8.6–10.2)
CHLORIDE SERPL-SCNC: 102 MMOL/L (ref 96–106)
CO2 SERPL-SCNC: 24 MMOL/L (ref 20–29)
CREAT SERPL-MCNC: 0.93 MG/DL (ref 0.76–1.27)
EGFR: 91 ML/MIN/1.73
EOSINOPHIL # BLD AUTO: 0.1 X10E3/UL (ref 0–0.4)
EOSINOPHIL NFR BLD AUTO: 2 %
ERYTHROCYTE [DISTWIDTH] IN BLOOD BY AUTOMATED COUNT: 13.8 % (ref 11.6–15.4)
GLOBULIN SER-MCNC: 2.8 G/DL (ref 1.5–4.5)
GLUCOSE SERPL-MCNC: 96 MG/DL (ref 65–99)
HCT VFR BLD AUTO: 44.9 % (ref 37.5–51)
HGB BLD-MCNC: 14.8 G/DL (ref 13–17.7)
IMM GRANULOCYTES # BLD: 0 X10E3/UL (ref 0–0.1)
IMM GRANULOCYTES NFR BLD: 0 %
LYMPHOCYTES # BLD AUTO: 2 X10E3/UL (ref 0.7–3.1)
LYMPHOCYTES NFR BLD AUTO: 26 %
MCH RBC QN AUTO: 28.6 PG (ref 26.6–33)
MCHC RBC AUTO-ENTMCNC: 33 G/DL (ref 31.5–35.7)
MCV RBC AUTO: 87 FL (ref 79–97)
MONOCYTES # BLD AUTO: 0.8 X10E3/UL (ref 0.1–0.9)
MONOCYTES NFR BLD AUTO: 11 %
NEUTROPHILS # BLD AUTO: 4.6 X10E3/UL (ref 1.4–7)
NEUTROPHILS NFR BLD AUTO: 60 %
PLATELET # BLD AUTO: 418 X10E3/UL (ref 150–450)
POTASSIUM SERPL-SCNC: 4.9 MMOL/L (ref 3.5–5.2)
PROT SERPL-MCNC: 7 G/DL (ref 6–8.5)
PSA SERPL-MCNC: 15.5 NG/ML (ref 0–4)
RBC # BLD AUTO: 5.18 X10E6/UL (ref 4.14–5.8)
SODIUM SERPL-SCNC: 139 MMOL/L (ref 134–144)
WBC # BLD AUTO: 7.6 X10E3/UL (ref 3.4–10.8)

## 2022-03-11 ENCOUNTER — OFFICE VISIT (OUTPATIENT)
Dept: URGENT CARE | Facility: CLINIC | Age: 67
End: 2022-03-11
Payer: MEDICARE

## 2022-03-11 VITALS — OXYGEN SATURATION: 100 % | RESPIRATION RATE: 16 BRPM | HEART RATE: 87 BPM | TEMPERATURE: 98.1 F

## 2022-03-11 DIAGNOSIS — N30.01 ACUTE CYSTITIS WITH HEMATURIA: Primary | ICD-10-CM

## 2022-03-11 LAB
SL AMB  POCT GLUCOSE, UA: ABNORMAL
SL AMB LEUKOCYTE ESTERASE,UA: ABNORMAL
SL AMB POCT BILIRUBIN,UA: ABNORMAL
SL AMB POCT BLOOD,UA: ABNORMAL
SL AMB POCT CLARITY,UA: ABNORMAL
SL AMB POCT COLOR,UA: ABNORMAL
SL AMB POCT KETONES,UA: ABNORMAL
SL AMB POCT NITRITE,UA: ABNORMAL
SL AMB POCT PH,UA: 7
SL AMB POCT SPECIFIC GRAVITY,UA: 1.01
SL AMB POCT URINE PROTEIN: 30
SL AMB POCT UROBILINOGEN: 1

## 2022-03-11 PROCEDURE — 87086 URINE CULTURE/COLONY COUNT: CPT | Performed by: PHYSICIAN ASSISTANT

## 2022-03-11 PROCEDURE — 81002 URINALYSIS NONAUTO W/O SCOPE: CPT | Performed by: PHYSICIAN ASSISTANT

## 2022-03-11 PROCEDURE — 99213 OFFICE O/P EST LOW 20 MIN: CPT | Performed by: PHYSICIAN ASSISTANT

## 2022-03-11 PROCEDURE — 87186 SC STD MICRODIL/AGAR DIL: CPT | Performed by: PHYSICIAN ASSISTANT

## 2022-03-11 RX ORDER — CEPHALEXIN 500 MG/1
500 CAPSULE ORAL EVERY 12 HOURS SCHEDULED
Qty: 14 CAPSULE | Refills: 0 | Status: SHIPPED | OUTPATIENT
Start: 2022-03-11 | End: 2022-03-18

## 2022-03-11 NOTE — PATIENT INSTRUCTIONS
Take antibiotic as prescribed for suspected UTI  Recommend OTC ibuprofen/tylenol as needed for discomfort  Follow up with PCP  Return or be seen in ER with any worsening symptoms  Patient understands and is agreeable with this plan

## 2022-03-11 NOTE — PROGRESS NOTES
[facility]        NAME: Stepan Hall is a 74 y o  male  : 1955    MRN: [id number]  DATE: 2022  TIME: 12:19 PM    Assessment and Plan   Acute cystitis with hematuria [N30 01]  1  Acute cystitis with hematuria  POCT urine dip    Urine culture    cephalexin (KEFLEX) 500 mg capsule         Patient Instructions     Patient Instructions   Take antibiotic as prescribed for suspected UTI  Recommend OTC ibuprofen/tylenol as needed for discomfort  Follow up with PCP  Return or be seen in ER with any worsening symptoms  Patient understands and is agreeable with this plan  Follow up with PCP in 3-5 days  Proceed to  ER if symptoms worsen  Chief Complaint     Chief Complaint   Patient presents with    Possible UTI     pt presents with urinary urgency, cloudy urine; started 2 weeks ago         History of Present Illness       Patient is a 44-year-old male presenting today with UTI symptoms x2 weeks  Patient notes over the last week or so he has been experiencing some dysuria and increased frequency of urination, notes that he had UTI 6 or 7 years ago and experienced similar symptoms, has not been taking any medications or alleviating factors for these symptoms  Patient notes a past medical history significant for prostate cancer, notes that he has quarterly PSAs and will be following up with his provider on 2022  Notes he has also had some bilateral low back pain which she describes as dull and achy starting a few days ago  Denies fever, chills, abdominal pain, hematuria, N/V/D  Review of Systems   Review of Systems   Constitutional: Negative for chills, fatigue and fever  HENT: Negative for ear pain, sinus pressure, sinus pain and sore throat  Eyes: Negative for pain and redness  Respiratory: Negative for cough and shortness of breath  Cardiovascular: Negative for chest pain and leg swelling     Gastrointestinal: Negative for abdominal pain, diarrhea, nausea and vomiting  Genitourinary: Positive for dysuria, frequency and urgency  Negative for difficulty urinating, flank pain, hematuria and penile discharge  Musculoskeletal: Negative for arthralgias and myalgias  Skin: Negative for rash  Neurological: Negative for dizziness, light-headedness and headaches           Current Medications       Current Outpatient Medications:     Ascorbic Acid (VITAMIN C ER) 1000 MG TBCR, Take 1,260 mg by mouth daily Liposomal Vit C  , Disp: , Rfl:     aspirin (ECOTRIN LOW STRENGTH) 81 mg EC tablet, Take 81 mg by mouth 2 (two) times a week  , Disp: , Rfl:     Cholecalciferol (VITAMIN D-3) 5000 units TABS, Take 11,000 Units by mouth daily  , Disp: , Rfl:     Ginger, Zingiber officinalis, (GINGER PO), Take by mouth, Disp: , Rfl:     Green Tea, Coby sinensis, (GREEN TEA EXTRACT PO), Take by mouth daily, Disp: , Rfl:     Multiple Vitamin (multivitamin) tablet, Take 1 tablet by mouth daily, Disp: , Rfl:     other medication, see sig,, Medication/product name: Quersetin   Strength: 500mg Sig (include dose, route, frequency): QD PO, Disp: , Rfl:     VITAMIN K PO, Take 400 mg by mouth  , Disp: , Rfl:     zinc gluconate 50 mg tablet, Take 15 mg by mouth daily, Disp: , Rfl:     Ascorbic Acid (VITAMIN C PO), Take by mouth 1260mg daily (Patient not taking: Reported on 11/12/2021 ), Disp: , Rfl:     cephalexin (KEFLEX) 500 mg capsule, Take 1 capsule (500 mg total) by mouth every 12 (twelve) hours for 7 days, Disp: 14 capsule, Rfl: 0    TiZANidine (ZANAFLEX) 2 MG capsule, Take 1 capsule (2 mg total) by mouth daily at bedtime as needed for muscle spasms (Patient not taking: Reported on 11/5/2021), Disp: 30 capsule, Rfl: 0    Current Allergies     Allergies as of 03/11/2022    (No Known Allergies)            The following portions of the patient's history were reviewed and updated as appropriate: allergies, current medications, past family history, past medical history, past social history, past surgical history and problem list      Past Medical History:   Diagnosis Date    Cancer [facility])     prostate cancer zoey 6    Dysuria     last assessed 7/30/14    Elevated PSA     resolved 3/24/17    Hx of removal of neck cyst     last assessed 9/8/15    Prostate cancer [facility])        History reviewed  No pertinent surgical history  Family History   Problem Relation Age of Onset    Leukemia Mother     Multiple sclerosis Mother     Multiple sclerosis Sister          Medications have been verified  Objective   Pulse 87   Temp 98 1 °F (36 7 °C)   Resp 16   SpO2 100%        Physical Exam     Physical Exam  Vitals reviewed  Constitutional:       Appearance: Normal appearance  HENT:      Head: Normocephalic and atraumatic  Right Ear: Tympanic membrane, ear canal and external ear normal       Left Ear: Tympanic membrane, ear canal and external ear normal       Nose: Nose normal       Mouth/Throat:      Mouth: Mucous membranes are moist       Pharynx: Oropharynx is clear  Eyes:      Conjunctiva/sclera: Conjunctivae normal    Cardiovascular:      Rate and Rhythm: Normal rate and regular rhythm  Pulses: Normal pulses  Heart sounds: Normal heart sounds  Pulmonary:      Effort: Pulmonary effort is normal       Breath sounds: Normal breath sounds  Abdominal:      General: Abdomen is flat  Bowel sounds are normal       Palpations: Abdomen is soft  Tenderness: There is no abdominal tenderness  There is no right CVA tenderness or left CVA tenderness  Musculoskeletal:         General: Normal range of motion  Cervical back: Normal range of motion  Skin:     General: Skin is warm  Capillary Refill: Capillary refill takes less than 2 seconds  Neurological:      General: No focal deficit present  Mental Status: He is alert and oriented to person, place, and time

## 2022-03-13 LAB — BACTERIA UR CULT: ABNORMAL

## 2022-03-21 ENCOUNTER — TELEPHONE (OUTPATIENT)
Dept: HEMATOLOGY ONCOLOGY | Facility: CLINIC | Age: 67
End: 2022-03-21

## 2022-03-22 NOTE — TELEPHONE ENCOUNTER
Patient called back to see if his PSA Lab orders were faxed to [phone]  He states he called yesterday however, he has not received a fax from our office  I informed patient I will reach out to his care team and someone will call him back once the fax was sent   His number is [phone]

## 2022-03-22 NOTE — TELEPHONE ENCOUNTER
Labs orders faxed successfully to [phone]  [facility] for patient

## 2022-03-24 LAB
ALBUMIN SERPL-MCNC: 4.2 G/DL (ref 3.8–4.8)
ALBUMIN/GLOB SERPL: 1.6 {RATIO} (ref 1.2–2.2)
ALP SERPL-CCNC: 70 IU/L (ref 44–121)
ALT SERPL-CCNC: 24 IU/L (ref 0–44)
AST SERPL-CCNC: 25 IU/L (ref 0–40)
BASOPHILS # BLD AUTO: 0.1 X10E3/UL (ref 0–0.2)
BASOPHILS NFR BLD AUTO: 2 %
BILIRUB SERPL-MCNC: 0.3 MG/DL (ref 0–1.2)
BUN SERPL-MCNC: 22 MG/DL (ref 8–27)
BUN/CREAT SERPL: 28 (ref 10–24)
CALCIUM SERPL-MCNC: 9.3 MG/DL (ref 8.6–10.2)
CHLORIDE SERPL-SCNC: 101 MMOL/L (ref 96–106)
CO2 SERPL-SCNC: 23 MMOL/L (ref 20–29)
CREAT SERPL-MCNC: 0.8 MG/DL (ref 0.76–1.27)
EGFR: 98 ML/MIN/1.73
EOSINOPHIL # BLD AUTO: 0.2 X10E3/UL (ref 0–0.4)
EOSINOPHIL NFR BLD AUTO: 2 %
ERYTHROCYTE [DISTWIDTH] IN BLOOD BY AUTOMATED COUNT: 13.8 % (ref 11.6–15.4)
GLOBULIN SER-MCNC: 2.6 G/DL (ref 1.5–4.5)
GLUCOSE SERPL-MCNC: 96 MG/DL (ref 65–99)
HCT VFR BLD AUTO: 42.2 % (ref 37.5–51)
HGB BLD-MCNC: 14.5 G/DL (ref 13–17.7)
IMM GRANULOCYTES # BLD: 0 X10E3/UL (ref 0–0.1)
IMM GRANULOCYTES NFR BLD: 0 %
LYMPHOCYTES # BLD AUTO: 1.8 X10E3/UL (ref 0.7–3.1)
LYMPHOCYTES NFR BLD AUTO: 28 %
MCH RBC QN AUTO: 29.7 PG (ref 26.6–33)
MCHC RBC AUTO-ENTMCNC: 34.4 G/DL (ref 31.5–35.7)
MCV RBC AUTO: 87 FL (ref 79–97)
MONOCYTES # BLD AUTO: 0.6 X10E3/UL (ref 0.1–0.9)
MONOCYTES NFR BLD AUTO: 8 %
NEUTROPHILS # BLD AUTO: 3.9 X10E3/UL (ref 1.4–7)
NEUTROPHILS NFR BLD AUTO: 60 %
PLATELET # BLD AUTO: 370 X10E3/UL (ref 150–450)
POTASSIUM SERPL-SCNC: 4.9 MMOL/L (ref 3.5–5.2)
PROT SERPL-MCNC: 6.8 G/DL (ref 6–8.5)
PSA SERPL-MCNC: 13.3 NG/ML (ref 0–4)
RBC # BLD AUTO: 4.88 X10E6/UL (ref 4.14–5.8)
SODIUM SERPL-SCNC: 139 MMOL/L (ref 134–144)
WBC # BLD AUTO: 6.6 X10E3/UL (ref 3.4–10.8)

## 2022-04-02 ENCOUNTER — OFFICE VISIT (OUTPATIENT)
Dept: URGENT CARE | Facility: CLINIC | Age: 67
End: 2022-04-02
Payer: MEDICARE

## 2022-04-02 VITALS
TEMPERATURE: 97.7 F | DIASTOLIC BLOOD PRESSURE: 90 MMHG | OXYGEN SATURATION: 99 % | HEIGHT: 70 IN | SYSTOLIC BLOOD PRESSURE: 180 MMHG | RESPIRATION RATE: 18 BRPM | WEIGHT: 161 LBS | HEART RATE: 80 BPM | BODY MASS INDEX: 23.05 KG/M2

## 2022-04-02 DIAGNOSIS — R39.9 UTI SYMPTOMS: ICD-10-CM

## 2022-04-02 DIAGNOSIS — N39.0 URINARY TRACT INFECTION WITHOUT HEMATURIA, SITE UNSPECIFIED: Primary | ICD-10-CM

## 2022-04-02 LAB
SL AMB  POCT GLUCOSE, UA: ABNORMAL
SL AMB LEUKOCYTE ESTERASE,UA: ABNORMAL
SL AMB POCT BILIRUBIN,UA: ABNORMAL
SL AMB POCT BLOOD,UA: ABNORMAL
SL AMB POCT CLARITY,UA: CLEAR
SL AMB POCT COLOR,UA: YELLOW
SL AMB POCT KETONES,UA: ABNORMAL
SL AMB POCT NITRITE,UA: ABNORMAL
SL AMB POCT PH,UA: 8
SL AMB POCT SPECIFIC GRAVITY,UA: 1
SL AMB POCT URINE PROTEIN: ABNORMAL
SL AMB POCT UROBILINOGEN: 0.2

## 2022-04-02 PROCEDURE — 87086 URINE CULTURE/COLONY COUNT: CPT | Performed by: PHYSICIAN ASSISTANT

## 2022-04-02 PROCEDURE — 81002 URINALYSIS NONAUTO W/O SCOPE: CPT | Performed by: PHYSICIAN ASSISTANT

## 2022-04-02 PROCEDURE — 99213 OFFICE O/P EST LOW 20 MIN: CPT | Performed by: PHYSICIAN ASSISTANT

## 2022-04-02 RX ORDER — NITROFURANTOIN 25; 75 MG/1; MG/1
100 CAPSULE ORAL 2 TIMES DAILY
Qty: 10 CAPSULE | Refills: 0 | Status: SHIPPED | OUTPATIENT
Start: 2022-04-02 | End: 2022-04-07

## 2022-04-02 NOTE — PROGRESS NOTES
[facility]        NAME: Manas Payton is a 74 y o  male  : 1955    MRN: [id number]  DATE: 2022  TIME: 1:14 PM    Assessment and Plan   Urinary tract infection without hematuria, site unspecified [N39 0]  1  Urinary tract infection without hematuria, site unspecified  nitrofurantoin (MACROBID) 100 mg capsule   2  UTI symptoms  POCT urine dip     Patient Instructions   Urinary tract infection  UA dip-  leuks , urine culture and sensitivity sent out and will call if we need to change antibiotic  rx macrobid twice daily x 5 days  Increase fluid intake  Tylenol/ibuprofen as needed for pain  azostat as needed for pain    Follow up with PCP in 3-5 days  Proceed to  ER if symptoms worsen  Chief Complaint     Chief Complaint   Patient presents with    Possible UTI     Pt reports of UTI s/s with increased urgency and pain  S/S started approx 2 days ago  History of Present Illness       Deric Torrez is a 33-year-old male who presents to clinic complaining of urinary urgency x3 days  He also notes increased frequency of urination and dysuria  He states he had a urinary tract infection on wes in March for which she was placed on cephalexin and resolved fully and now the symptoms are new  He denies any fever, chills, hematuria, back pain, abdominal pain, flank pain, or penile discharge  He is sexually active with 1 partner and the same partner for the last 25 years  Review of Systems   Review of Systems   Gastrointestinal: Negative for abdominal pain  Genitourinary: Positive for dysuria, frequency and urgency  Negative for flank pain, hematuria and penile discharge  Musculoskeletal: Negative for back pain           Current Medications       Current Outpatient Medications:     Ascorbic Acid (VITAMIN C ER) 1000 MG TBCR, Take 1,260 mg by mouth daily Liposomal Vit C  , Disp: , Rfl:     Ascorbic Acid (VITAMIN C PO), Take by mouth 1260mg daily (Patient not taking: Reported on 2021 ), Disp: , Rfl:     aspirin (ECOTRIN LOW STRENGTH) 81 mg EC tablet, Take 81 mg by mouth 2 (two) times a week  , Disp: , Rfl:     Cholecalciferol (VITAMIN D-3) 5000 units TABS, Take 11,000 Units by mouth daily  , Disp: , Rfl:     Ginger, Zingiber officinalis, (GINGER PO), Take by mouth, Disp: , Rfl:     Green Tea, Coby sinensis, (GREEN TEA EXTRACT PO), Take by mouth daily, Disp: , Rfl:     Multiple Vitamin (multivitamin) tablet, Take 1 tablet by mouth daily, Disp: , Rfl:     nitrofurantoin (MACROBID) 100 mg capsule, Take 1 capsule (100 mg total) by mouth 2 (two) times a day for 5 days, Disp: 10 capsule, Rfl: 0    other medication, see sig,, Medication/product name: Quersetin   Strength: 500mg Sig (include dose, route, frequency): QD PO, Disp: , Rfl:     TiZANidine (ZANAFLEX) 2 MG capsule, Take 1 capsule (2 mg total) by mouth daily at bedtime as needed for muscle spasms (Patient not taking: Reported on 11/5/2021), Disp: 30 capsule, Rfl: 0    VITAMIN K PO, Take 400 mg by mouth  , Disp: , Rfl:     zinc gluconate 50 mg tablet, Take 15 mg by mouth daily, Disp: , Rfl:     Current Allergies     Allergies as of 04/02/2022    (No Known Allergies)            The following portions of the patient's history were reviewed and updated as appropriate: allergies, current medications, past family history, past medical history, past social history, past surgical history and problem list      Past Medical History:   Diagnosis Date    Cancer [facility])     prostate cancer zoey 6    Dysuria     last assessed 7/30/14    Elevated PSA     resolved 3/24/17    Hx of removal of neck cyst     last assessed 9/8/15    Prostate cancer [facility])        History reviewed  No pertinent surgical history  Family History   Problem Relation Age of Onset    Leukemia Mother     Multiple sclerosis Mother     Multiple sclerosis Sister          Medications have been verified          Objective   BP (!) 180/90   Pulse 80   Temp 97 7 °F (36 5 °C)   Resp 18   Ht 5' 10" (1 778 m)   Wt 73 kg (161 lb)   SpO2 99%   BMI 23 10 kg/m²   No LMP for male patient  Physical Exam     Physical Exam  Vitals and nursing note reviewed  Constitutional:       General: He is not in acute distress  Appearance: Normal appearance  He is not ill-appearing  Cardiovascular:      Rate and Rhythm: Normal rate and regular rhythm  Heart sounds: Normal heart sounds  Pulmonary:      Effort: Pulmonary effort is normal       Breath sounds: Normal breath sounds  Abdominal:      General: Bowel sounds are normal  There is no distension  Palpations: Abdomen is soft  Tenderness: There is no abdominal tenderness  There is no right CVA tenderness, left CVA tenderness, guarding or rebound  Neurological:      Mental Status: He is alert and oriented to person, place, and time     Psychiatric:         Mood and Affect: Mood normal          Behavior: Behavior normal

## 2022-04-04 LAB — BACTERIA UR CULT: ABNORMAL

## 2022-05-04 ENCOUNTER — TELEPHONE (OUTPATIENT)
Dept: HEMATOLOGY ONCOLOGY | Facility: CLINIC | Age: 67
End: 2022-05-04

## 2022-05-04 NOTE — TELEPHONE ENCOUNTER
CALL RETURN FORM   Reason for patient call? Patient requesting script for PSA test   He needs to have test done prior to his upcoming appt      Patient's primary oncologist? Dr Juan Dolan   Name of person the patient was calling for? Dr Juan Dolan   Any additional information to add, if applicable? [phone]   Informed patient that the message will be forwarded to the team and someone will get back to them as soon as possible    Did you relay this information to the patient?   yes

## 2022-05-05 LAB
BASOPHILS # BLD AUTO: 0.1 X10E3/UL (ref 0–0.2)
BASOPHILS NFR BLD AUTO: 1 %
EOSINOPHIL # BLD AUTO: 0.1 X10E3/UL (ref 0–0.4)
EOSINOPHIL NFR BLD AUTO: 1 %
ERYTHROCYTE [DISTWIDTH] IN BLOOD BY AUTOMATED COUNT: 13.8 % (ref 11.6–15.4)
HCT VFR BLD AUTO: 41.6 % (ref 37.5–51)
HGB BLD-MCNC: 14.3 G/DL (ref 13–17.7)
IMM GRANULOCYTES # BLD: 0 X10E3/UL (ref 0–0.1)
IMM GRANULOCYTES NFR BLD: 0 %
LYMPHOCYTES # BLD AUTO: 1.8 X10E3/UL (ref 0.7–3.1)
LYMPHOCYTES NFR BLD AUTO: 29 %
MCH RBC QN AUTO: 30 PG (ref 26.6–33)
MCHC RBC AUTO-ENTMCNC: 34.4 G/DL (ref 31.5–35.7)
MCV RBC AUTO: 87 FL (ref 79–97)
MONOCYTES # BLD AUTO: 0.9 X10E3/UL (ref 0.1–0.9)
MONOCYTES NFR BLD AUTO: 14 %
NEUTROPHILS # BLD AUTO: 3.4 X10E3/UL (ref 1.4–7)
NEUTROPHILS NFR BLD AUTO: 55 %
PLATELET # BLD AUTO: 350 X10E3/UL (ref 150–450)
RBC # BLD AUTO: 4.76 X10E6/UL (ref 4.14–5.8)
WBC # BLD AUTO: 6.2 X10E3/UL (ref 3.4–10.8)

## 2022-05-06 LAB
ALBUMIN SERPL-MCNC: 4.2 G/DL (ref 3.8–4.8)
ALBUMIN/GLOB SERPL: 1.9 {RATIO} (ref 1.2–2.2)
ALP SERPL-CCNC: 73 IU/L (ref 44–121)
ALT SERPL-CCNC: 20 IU/L (ref 0–44)
AST SERPL-CCNC: 24 IU/L (ref 0–40)
BILIRUB SERPL-MCNC: 0.3 MG/DL (ref 0–1.2)
BUN SERPL-MCNC: 23 MG/DL (ref 8–27)
BUN/CREAT SERPL: 32 (ref 10–24)
CALCIUM SERPL-MCNC: 9.1 MG/DL (ref 8.6–10.2)
CHLORIDE SERPL-SCNC: 102 MMOL/L (ref 96–106)
CO2 SERPL-SCNC: 21 MMOL/L (ref 20–29)
CREAT SERPL-MCNC: 0.73 MG/DL (ref 0.76–1.27)
EGFR: 101 ML/MIN/1.73
GLOBULIN SER-MCNC: 2.2 G/DL (ref 1.5–4.5)
GLUCOSE SERPL-MCNC: 93 MG/DL (ref 65–99)
POTASSIUM SERPL-SCNC: 5 MMOL/L (ref 3.5–5.2)
PROT SERPL-MCNC: 6.4 G/DL (ref 6–8.5)
PSA SERPL-MCNC: 10 NG/ML (ref 0–4)
SODIUM SERPL-SCNC: 139 MMOL/L (ref 134–144)

## 2022-05-10 ENCOUNTER — TELEPHONE (OUTPATIENT)
Dept: HEMATOLOGY ONCOLOGY | Facility: HOSPITAL | Age: 67
End: 2022-05-10

## 2022-05-10 NOTE — TELEPHONE ENCOUNTER
Called patient and LVM for the patient to have virtual visit tomorrow and to go through my chart for the visit  LVM with hopeline # if this appt needed to be rescheduled

## 2022-05-11 ENCOUNTER — TELEPHONE (OUTPATIENT)
Dept: HEMATOLOGY ONCOLOGY | Facility: CLINIC | Age: 67
End: 2022-05-11

## 2022-05-11 NOTE — TELEPHONE ENCOUNTER
Appointment Cancellation Or Reschedule     Person calling in patient   Provider Dr Ralph Reed   Office Visit Date and Time 5/11 @ 140    Office Visit Location virtual   Did patient want to reschedule their office appointment? If so, when was it scheduled to? Yes 6/2 @ 140pm   Is this patient calling to reschedule an infusion appointment? no   When is their next infusion appointment? n/a   Is this patient a Chemo patient? no   Reason for Cancellation or Reschedule Schedule conflict     If the patient is a treatment patient, please route this to the office nurse  If the patient is not on treatment, please route to the office MA

## 2022-06-02 ENCOUNTER — OFFICE VISIT (OUTPATIENT)
Dept: HEMATOLOGY ONCOLOGY | Facility: CLINIC | Age: 67
End: 2022-06-02
Payer: MEDICARE

## 2022-06-02 VITALS
BODY MASS INDEX: 22.62 KG/M2 | HEART RATE: 87 BPM | RESPIRATION RATE: 18 BRPM | SYSTOLIC BLOOD PRESSURE: 128 MMHG | OXYGEN SATURATION: 97 % | HEIGHT: 70 IN | TEMPERATURE: 97.1 F | DIASTOLIC BLOOD PRESSURE: 82 MMHG | WEIGHT: 158 LBS

## 2022-06-02 DIAGNOSIS — C61 PROSTATE CANCER (HCC): Primary | ICD-10-CM

## 2022-06-02 PROCEDURE — 99214 OFFICE O/P EST MOD 30 MIN: CPT | Performed by: INTERNAL MEDICINE

## 2022-06-02 NOTE — PROGRESS NOTES
Gabriel Turcios  1955  [address]      DISCUSSION/SUMMARY:    79-year-old male diagnosed with prostate adenocarcinoma (1+ core, Joe score of 6) in 2015  The plan since 2015 has been surveillance  Mr Julian Blackburn continues to feel well and clinically there are no concerning findings  Patient has no  issues  Recent CBC results and CMP were good/acceptable  PSA level is actually lower than before  The plan is to continue with surveillance  Patient will undergo a repeat multiparametric MRI of the prostate without contrast next March 2023  Patient will return to the oncology office in April 2023  Patient would like to hold off on any additional blood work including PSA testing  Patient will also follow up with Dr Julián Linn (urologist in [facility]) as directed  He reads the mpMRI/pelvis  Axumin PET-CT or PSMA PET-CT not indicated at this time  Patient is to return in 10 months  Mr Julian Blackburn knows to call the hematology/oncology office if there are any other questions or concerns or if he develops any  issues, body aches, unplanned weight loss, hematuria etc     Carefully review your medication list and verify that the list is accurate and up-to-date  Please call the hematology/oncology office if there are medications missing from the list, medications on the list that you are not currently taking or if there is a dosage or instruction that is different from how you're taking that medication      Patient goals and areas of care: mpMRI prostate March 2023, office visit follow-up April 2023  Barriers to care:  None  Patient is able to self-care   ______________________________________________________________________________________    Chief Complaint   Patient presents with    Follow-up    Prostate Cancer     History of Present Illness:  79-year-old male with a somewhat complicated prostate cancer history referred for oncology evaluation  Mr Herb Maki was diagnosed with a urinary tract infection in 2015  At that time patient was found to have an elevated PSA  Patient received antibiotics and the UTI resolved  Patient was subsequently followed by Urology; PSA remained elevated  Patient underwent a 12 core biopsy  Reportedly 1 of the 12 cores demonstrated [place] 6 adenocarcinoma  There was no evidence of locally advanced or metastatic disease - patient started surveillance  Patient states that his PSA initially was in the 3-4 range but more recently has been in the 8-10 range  Mr Herb Maki has been followed by Dr Roshan Chapa; patient has also been followed by a urologist in [place], Dr Estrellita Ortiz  Patient underwent a 2nd biopsy approximately 3 years ago  Mr Herb Maki states that the 2nd pathology results were about the same as before, no evidence of disease progression  The plan up to this point has been repeating the multiparamedic MRIs without contrast   The scans are read by Dr Deidre Damon in the [place]; patient either then has a direct appointment with Dr Deidre Damon or patient has a tele visit  As above, patient also follows with Dr Roshan Chapa  As per patient, recently there has been a difference of opinion as far as repeating a prostate biopsy (or not)  Mr Herb Maki states feeling well, baseline  No headaches or body aches  Appetite is good, weight is stable  No  or GI issues or bleeding  Activities are baseline  Routine health maintenance and medical care is up-to-date  As discussed previously, patient has not received the COVID vaccine - personal decision  Review of Systems   Constitutional: Negative  HENT: Negative  Eyes: Negative  Respiratory: Negative  Cardiovascular: Negative  Gastrointestinal: Negative  Endocrine: Negative  Genitourinary: Negative  Musculoskeletal: Negative  Skin: Negative      Allergic/Immunologic: Negative  Neurological: Negative  Hematological: Negative  Psychiatric/Behavioral: Negative  All other systems reviewed and are negative      Patient Active Problem List   Diagnosis    Acrochordon    Hypercholesteremia    Prostate cancer ([address] )    Annual physical exam    Radicular pain of upper extremity    Primary hypertension     Past Medical History:   Diagnosis Date    Cancer [facility])     prostate cancer zoey 6    Dysuria     last assessed 7/30/14    Elevated PSA     resolved 3/24/17    Hx of removal of neck cyst     last assessed 9/8/15    Prostate cancer [facility])      Past surgical history:  No prior blood transfusions    Family History   Problem Relation Age of Onset    Leukemia Mother     Multiple sclerosis Mother     Multiple sclerosis Sister    Family history:  No known familial or genetic diseases including prostate cancer    Social History     Socioeconomic History    Marital status: /Civil Union     Spouse name: Not on file    Number of children: Not on file    Years of education: Not on file    Highest education level: Not on file   Occupational History    Not on file   Tobacco Use    Smoking status: Former Smoker    Smokeless tobacco: Never Used   Vaping Use    Vaping Use: Never used   Substance and Sexual Activity    Alcohol use: No    Drug use: No    Sexual activity: Not on file   Other Topics Concern    Not on file   Social History Narrative    Not on file     Social Determinants of Health     Financial Resource Strain: Not on file   Food Insecurity: Not on file   Transportation Needs: Not on file   Physical Activity: Not on file   Stress: Not on file   Social Connections: Not on file   Intimate Partner Violence: Not on file   Housing Stability: Not on file   Social history:  No tobacco, alcohol or drug abuse, no toxic exposure    Current Outpatient Medications:     Ascorbic Acid (VITAMIN C ER) 1000 MG TBCR, Take 1,260 mg by mouth daily Liposomal Vit C  , Disp: , Rfl:     Cholecalciferol (VITAMIN D-3) 5000 units TABS, Take 11,000 Units by mouth daily  , Disp: , Rfl:     Ginger, Zingiber officinalis, (GINGER PO), Take by mouth, Disp: , Rfl:     Green Tea, Coby sinensis, (GREEN TEA EXTRACT PO), Take by mouth daily, Disp: , Rfl:     Multiple Vitamin (multivitamin) tablet, Take 1 tablet by mouth daily, Disp: , Rfl:     other medication, see sig,, Medication/product name: Quersetin   Strength: 500mg Sig (include dose, route, frequency): QD PO, Disp: , Rfl:     VITAMIN K PO, Take 400 mg by mouth  , Disp: , Rfl:     zinc gluconate 50 mg tablet, Take 15 mg by mouth daily, Disp: , Rfl:     Ascorbic Acid (VITAMIN C PO), Take by mouth 1260mg daily (Patient not taking: No sig reported), Disp: , Rfl:     aspirin (ECOTRIN LOW STRENGTH) 81 mg EC tablet, Take 81 mg by mouth 2 (two) times a week   (Patient not taking: Reported on 6/2/2022), Disp: , Rfl:     TiZANidine (ZANAFLEX) 2 MG capsule, Take 1 capsule (2 mg total) by mouth daily at bedtime as needed for muscle spasms (Patient not taking: No sig reported), Disp: 30 capsule, Rfl: 0    No Known Allergies    Vitals:    06/02/22 1340   BP: 128/82   Pulse: 87   Resp: 18   Temp: (!) 97 1 °F (36 2 °C)   SpO2: 97%     Physical Exam  Constitutional:       Appearance: He is well-developed  Comments: Well-nourished middle-aged male, no respiratory distress, no signs of pain   HENT:      Head: Normocephalic and atraumatic  Right Ear: External ear normal       Left Ear: External ear normal    Eyes:      Conjunctiva/sclera: Conjunctivae normal       Pupils: Pupils are equal, round, and reactive to light  Cardiovascular:      Rate and Rhythm: Normal rate and regular rhythm  Heart sounds: Normal heart sounds  Pulmonary:      Effort: Pulmonary effort is normal       Breath sounds: Normal breath sounds  Abdominal:      General: Bowel sounds are normal       Palpations: Abdomen is soft        Comments: Soft, nontender, +bowel sounds, cannot palpate liver or spleen, no guarding, no rigidity or rebound   Musculoskeletal:         General: Normal range of motion  Cervical back: Normal range of motion and neck supple  Skin:     General: Skin is warm  Comments: Good color, warm, moist, no petechiae or ecchymoses   Neurological:      Mental Status: He is alert and oriented to person, place, and time  Deep Tendon Reflexes: Reflexes are normal and symmetric  Psychiatric:         Behavior: Behavior normal          Thought Content: Thought content normal          Judgment: Judgment normal      Extremities:  No lower extremity edema bilaterally, no cords, pulses are 1+ bilaterally  Lymphatics:  No adenopathy in the neck, supraclavicular region, axilla and groin bilaterally    Labs    05/05/2022 WBC = 6 2 hemoglobin = 14 3 hematocrit = 42 platelet = 456 neutrophil = 55% BUN = 23 creatinine = 0 73 calcium = 9 1 LFTs WNL        09/28/2021 4 K score = 31% PSA = 10 43    Imaging            02/25/2021 [facility] [address] [place] [address] MRI prostate without contrast   Impression stated MRI negative for significant or index lesion, indeterminate or high-grade neoplastic disease  Negative for concordant target lesion  Negative for neurovascular bundle involvement, extracapsular extension, seminal vesicle extension, pathologic lymphadenopathy or bone metastases  Transitional zone demonstrates an enlarged median lobe and BPH nodules in an enlarged gland  Peripheral zone negative for concurrent focal abnormality with bilateral, symmetrical, homogeneous decrease signal intensity compatible with chronic inflammation or prostatitis  Recommend continued surveillance with repeat PSA at 6 and 12 months, recommend repeat bpMRI in 12 months  Final impression stated PI-RADS category 2, clinically significant cancer is unlikely to be present      Pathology    Case Report   Surgical Pathology Report                         Case: [id number]                                    Authorizing Provider: Denisse Kramer MD           Collected:           12/29/2021 0706               Ordering Location:     [facility]      Received:            12/29/2021 [facility]                                      [facility]                                                                                   Laboratory                                                                    Pathologist:           Dalila Alvarez MD                                                    Specimen:    Prostate, Prostate Biopsy ( 27 slides [id number] [employer]Forward, collected                        9/12/2017)                                                                                 Final Diagnosis   Prostate, Prostate Biopsy ( 27 slides [id number] [employer]Forward, collected 9/12/2017):     A  LEFT APEX, NEEDLE BIOPSY:  - Prostatic adenocarcinoma, acinar type, Joe score 3 + 3 = 6, Prognostic Grade Group 1, continuously involving 2 of 2 cores (10%, 5%)  - Perineural invasion: Not identified      B  LEFT MID, NEEDLE BIOPSY:  - Benign prostate tissue      C  LEFT BASE, NEEDLE BIOPSY:  - Benign prostate tissue      D  LEFT LATERAL APEX, NEEDLE BIOPSY:  - Prostatic adenocarcinoma, acinar type, [place] score 3 + 3 = 6, Prognostic Grade Group 1, continuously involving 1 of 2 cores (5%, 0%)  - Perineural invasion: Not identified      E  LEFT LATERAL MED, NEEDLE BIOPSY:  - Benign prostate tissue      F  LEFT LATERAL BASE, NEEDLE BIOPSY:  - Benign prostate tissue      G  RIGHT APEX, NEEDLE BIOPSY:  - Benign prostate tissue      H  RIGHT MID, NEEDLE BIOPSY:  - Benign prostate tissue      I  RIGHT BASE, NEEDLE BIOPSY:  - Benign prostate tissue      J  RIGHT LATERAL APEX, NEEDLE BIOPSY:  - Benign prostate tissue      K  RIGHT LATERAL MID, NEEDLE BIOPSY:  - Benign prostate tissue      L  RIGHT LATERAL BASE, NEEDLE BIOPSY  - Benign prostate tissue  Electronically signed by Uzma Hussein MD on 12/29/2021 at 11:15 AM

## 2022-06-08 ENCOUNTER — TELEPHONE (OUTPATIENT)
Dept: HEMATOLOGY ONCOLOGY | Facility: MEDICAL CENTER | Age: 67
End: 2022-06-08

## 2022-06-08 DIAGNOSIS — C61 PROSTATE CANCER (HCC): Primary | ICD-10-CM

## 2022-06-08 NOTE — TELEPHONE ENCOUNTER
Spoke with Arline Doll regarding the [employer]  He mentioned it was ok to mail it to him

## 2022-08-18 ENCOUNTER — TELEPHONE (OUTPATIENT)
Dept: FAMILY MEDICINE CLINIC | Facility: CLINIC | Age: 67
End: 2022-08-18

## 2023-01-30 ENCOUNTER — TELEPHONE (OUTPATIENT)
Dept: HEMATOLOGY ONCOLOGY | Facility: CLINIC | Age: 68
End: 2023-01-30

## 2023-01-30 NOTE — TELEPHONE ENCOUNTER
CALL RETURN FORM   Reason for patient call? Patient calling to speak with Dr Barbara Walter  He states he needs lab orders prior to having his mri 3/16/23  He is not using a [facility] for the MRI  Patient's primary oncologist? Dr Barbara Walter   Name of person the patient was calling for? Dr Barbara Walter   Any additional information to add, if applicable? [phone]   Informed patient that the message will be forwarded to the team and someone will get back to them as soon as possible    Did you relay this information to the patient? Yes  They are requesting an order, bun and creatine and psa    He is also requesting his normal labs be ordered metabolic panel

## 2023-01-30 NOTE — TELEPHONE ENCOUNTER
Call [place]   Reason for patient call? Patient returning call from [facility]  Patient would like to clarify that the labs he needs prior to his CT are BUN and Creatinine  If someone other than patient is calling, are they listed on the communication consent? N/A   Patient's primary oncologist? Dr Chapin Rich    Person/Department that the call was transferred to? Time that call was transferred? [facility] @ 3:08PM   Informed patient that the message will be forwarded to the team and someone will get back to them as soon as possible  Did you relay this information to the patient?  Yes

## 2023-01-30 NOTE — TELEPHONE ENCOUNTER
Returned phone call of patient, Dr Anisha Ronquillo has already entered labs on behalf of patient and are in the chart  LVM with name office and the above information

## 2023-02-07 ENCOUNTER — TELEPHONE (OUTPATIENT)
Dept: HEMATOLOGY ONCOLOGY | Facility: CLINIC | Age: 68
End: 2023-02-07

## 2023-02-07 DIAGNOSIS — C61 PROSTATE CANCER (HCC): Primary | ICD-10-CM

## 2023-02-07 NOTE — TELEPHONE ENCOUNTER
Call Transfer   Reason for patient call? Requesting to speak with Sudarshan regarding VM from 1/30/23   If someone other than patient is calling, are they listed on the communication consent? na   Patient's primary oncologist? Dr Robbie Burns   Person/Department that the call was transferred to? Time that call was transferred? [place]@ 2:17pm   Informed patient that the message will be forwarded to the team and someone will get back to them as soon as possible  Did you relay this information to the patient?  yes

## 2023-03-09 ENCOUNTER — APPOINTMENT (OUTPATIENT)
Dept: LAB | Facility: CLINIC | Age: 68
End: 2023-03-09

## 2023-03-09 DIAGNOSIS — C61 PROSTATE CANCER (HCC): ICD-10-CM

## 2023-03-09 LAB
ALBUMIN SERPL BCP-MCNC: 3.6 G/DL (ref 3.5–5)
ALP SERPL-CCNC: 60 U/L (ref 46–116)
ALT SERPL W P-5'-P-CCNC: 50 U/L (ref 12–78)
ANION GAP SERPL CALCULATED.3IONS-SCNC: 4 MMOL/L (ref 4–13)
AST SERPL W P-5'-P-CCNC: 27 U/L (ref 5–45)
BASOPHILS # BLD AUTO: 0.08 THOUSANDS/ÂΜL (ref 0–0.1)
BASOPHILS NFR BLD AUTO: 1 % (ref 0–1)
BILIRUB SERPL-MCNC: 0.47 MG/DL (ref 0.2–1)
BUN SERPL-MCNC: 25 MG/DL (ref 5–25)
CALCIUM SERPL-MCNC: 9.1 MG/DL (ref 8.3–10.1)
CHLORIDE SERPL-SCNC: 107 MMOL/L (ref 96–108)
CO2 SERPL-SCNC: 28 MMOL/L (ref 21–32)
CREAT SERPL-MCNC: 0.81 MG/DL (ref 0.6–1.3)
EOSINOPHIL # BLD AUTO: 0.15 THOUSAND/ÂΜL (ref 0–0.61)
EOSINOPHIL NFR BLD AUTO: 3 % (ref 0–6)
ERYTHROCYTE [DISTWIDTH] IN BLOOD BY AUTOMATED COUNT: 13.9 % (ref 11.6–15.1)
GFR SERPL CREATININE-BSD FRML MDRD: 92 ML/MIN/1.73SQ M
GLUCOSE P FAST SERPL-MCNC: 94 MG/DL (ref 65–99)
HCT VFR BLD AUTO: 43.5 % (ref 36.5–49.3)
HGB BLD-MCNC: 14.6 G/DL (ref 12–17)
IMM GRANULOCYTES # BLD AUTO: 0.01 THOUSAND/UL (ref 0–0.2)
IMM GRANULOCYTES NFR BLD AUTO: 0 % (ref 0–2)
LYMPHOCYTES # BLD AUTO: 1.75 THOUSANDS/ÂΜL (ref 0.6–4.47)
LYMPHOCYTES NFR BLD AUTO: 30 % (ref 14–44)
MCH RBC QN AUTO: 29.4 PG (ref 26.8–34.3)
MCHC RBC AUTO-ENTMCNC: 33.6 G/DL (ref 31.4–37.4)
MCV RBC AUTO: 88 FL (ref 82–98)
MONOCYTES # BLD AUTO: 0.58 THOUSAND/ÂΜL (ref 0.17–1.22)
MONOCYTES NFR BLD AUTO: 10 % (ref 4–12)
NEUTROPHILS # BLD AUTO: 3.24 THOUSANDS/ÂΜL (ref 1.85–7.62)
NEUTS SEG NFR BLD AUTO: 56 % (ref 43–75)
NRBC BLD AUTO-RTO: 0 /100 WBCS
PLATELET # BLD AUTO: 384 THOUSANDS/UL (ref 149–390)
PMV BLD AUTO: 10.3 FL (ref 8.9–12.7)
POTASSIUM SERPL-SCNC: 4.8 MMOL/L (ref 3.5–5.3)
PROT SERPL-MCNC: 6.9 G/DL (ref 6.4–8.4)
PSA SERPL-MCNC: 12.2 NG/ML (ref 0–4)
RBC # BLD AUTO: 4.96 MILLION/UL (ref 3.88–5.62)
SODIUM SERPL-SCNC: 139 MMOL/L (ref 135–147)
WBC # BLD AUTO: 5.81 THOUSAND/UL (ref 4.31–10.16)

## 2023-03-15 ENCOUNTER — TELEPHONE (OUTPATIENT)
Dept: OTHER | Facility: OTHER | Age: 68
End: 2023-03-15

## 2023-03-15 NOTE — TELEPHONE ENCOUNTER
Mague Martinez from [facility] calling stating that they need a script order for this patient's MRI Prostate without contrast  Patient lost his that he was given  Please fax it to: [phone]

## 2023-03-23 ENCOUNTER — TELEPHONE (OUTPATIENT)
Dept: HEMATOLOGY ONCOLOGY | Facility: CLINIC | Age: 68
End: 2023-03-23

## 2023-03-23 NOTE — TELEPHONE ENCOUNTER
Confirmed with patient appt  On 5/12/23 with Dr Tami Busby  Appt  Was changed from 4/20/23 due to a change in Dr Clement Felix schedule

## 2023-05-11 ENCOUNTER — OFFICE VISIT (OUTPATIENT)
Dept: HEMATOLOGY ONCOLOGY | Facility: CLINIC | Age: 68
End: 2023-05-11

## 2023-05-11 VITALS
DIASTOLIC BLOOD PRESSURE: 82 MMHG | RESPIRATION RATE: 18 BRPM | BODY MASS INDEX: 23.19 KG/M2 | OXYGEN SATURATION: 97 % | HEART RATE: 88 BPM | HEIGHT: 70 IN | SYSTOLIC BLOOD PRESSURE: 128 MMHG | TEMPERATURE: 97.4 F | WEIGHT: 162 LBS

## 2023-05-11 DIAGNOSIS — C61 PROSTATE CANCER (HCC): Primary | ICD-10-CM

## 2023-05-11 NOTE — PROGRESS NOTES
Christina Weeks  1955  [address] [facility] [place]  [address] [facility] [place] [address]    DISCUSSION/SUMMARY:    57-year-old male diagnosed with prostate adenocarcinoma (1+ core, Joe score of 6) in 2015  The plan since 2015 has been surveillance  Mr Claudell Harari continues to feel well and clinically there are no concerning findings  Patient has no  issues  Recent CBC results and CMP were good/acceptable  Recent bi-parametric MRI did not demonstrate any evidence for progression  PSA level is minimally higher than before  The plan is to continue with surveillance  Patient will undergo a repeat biparametric MRI of the prostate without contrast next March 2024  Patient will return to the oncology office in April-May 2024  Patient follows up with Dr Drea Fan (urologist in [facility]) also  He reads the mpMRI/pelvis  Dr Kayli Ybarra has requested the patient have a PSA performed approximately every 4 months at least for the next year - standing order was given  Axumin PET-CT or PSMA PET-CT not indicated at this time  Mr Claudell Harari knows to call the hematology/oncology office if there are any other questions or concerns or if he develops any  issues, body aches, unplanned weight loss, hematuria etc     Carefully review your medication list and verify that the list is accurate and up-to-date  Please call the hematology/oncology office if there are medications missing from the list, medications on the list that you are not currently taking or if there is a dosage or instruction that is different from how you're taking that medication      Patient goals and areas of care: mpMRI prostate March 2024, serial PSAs  Barriers to care:  None  Patient is able to self-care  ______________________________________________________________________________________    Chief Complaint   Patient presents with   • Follow-up     Prostate cancer       History of Present Illness:  21-year-old male with a somewhat complicated prostate cancer history referred for oncology evaluation  Mr Steven Barraza was diagnosed with a urinary tract infection in 2015  At that time patient was found to have an elevated PSA  Patient received antibiotics and the UTI resolved  Patient was subsequently followed by Urology; PSA remained elevated  Patient underwent a 12 core biopsy  Reportedly 1 of the 12 cores demonstrated Joe 6 adenocarcinoma  There was no evidence of locally advanced or metastatic disease - patient started surveillance  Patient states that his PSA initially was in the 3-4 range but more recently has been in the 8-10 range  Mr Steven Barraza has been followed by Dr Isreal Merino; patient has also been followed by a urologist in [place], Dr Berhane Ochoa  Patient underwent a 2nd biopsy approximately 3 years ago  Mr Steven Barraza states that the 2nd pathology results were about the same as before, no evidence of disease progression  The plan up to this point has been repeating the multiparamedic MRIs without contrast   The scans are read by Dr Justin Polo in the [place]; patient either then has a direct appointment with Dr Justin Polo or patient has a tele visit  As above, patient also follows with Dr Isreal Merino  As per patient, recently there has been a difference of opinion as far as repeating a prostate biopsy (or not)  Mr Steven Barraza is about the same as before  No body aches  No  issues, no hematuria, frequency or incontinence  Appetite is good, weight is stable  Patient continues to be very active  Routine health maintenance and medical care is up-to-date except for pending colonoscopy  Review of Systems   Constitutional: Negative  HENT: Negative  Eyes: Negative  Respiratory: Negative  Cardiovascular: Negative  Gastrointestinal: Negative  Endocrine: Negative  Genitourinary: Negative  Musculoskeletal: Negative  Skin: Negative  Allergic/Immunologic: Negative  Neurological: Negative  Hematological: Negative  Psychiatric/Behavioral: Negative  All other systems reviewed and are negative      Patient Active Problem List   Diagnosis   • Acrochordon   • Hypercholesteremia   • Prostate cancer [facility])   • Annual physical exam   • Radicular pain of upper extremity   • Primary hypertension     Past Medical History:   Diagnosis Date   • Cancer [facility])     prostate cancer zoey 6   • Dysuria     last assessed 7/30/14   • Elevated PSA     resolved 3/24/17   • Hx of removal of neck cyst     last assessed 9/8/15   • Prostate cancer ([facility] [address] )      Past surgical history:  No prior blood transfusions    Family History   Problem Relation Age of Onset   • Leukemia Mother    • Multiple sclerosis Mother    • Multiple sclerosis Sister    Family history:  No known familial or genetic diseases including prostate cancer    Social History     Socioeconomic History   • Marital status: /Civil Union     Spouse name: Not on file   • Number of children: Not on file   • Years of education: Not on file   • Highest education level: Not on file   Occupational History   • Not on file   Tobacco Use   • Smoking status: Former   • Smokeless tobacco: Never   Vaping Use   • Vaping Use: Never used   Substance and Sexual Activity   • Alcohol use: No   • Drug use: No   • Sexual activity: Not on file   Other Topics Concern   • Not on file   Social History Narrative   • Not on file     Social Determinants of Health     Financial Resource Strain: Not on file   Food Insecurity: Not on file   Transportation Needs: Not on file   Physical Activity: Not on file   Stress: Not on file   Social Connections: Not on file   Intimate Partner Violence: Not on file   Housing Stability: Not on file   Social history:  No tobacco, alcohol or drug abuse, no toxic exposure    Current Outpatient Medications:   •  Ascorbic Acid (VITAMIN C ER) 1000 MG TBCR, Take 1,260 mg by mouth daily Liposomal Vit C  , Disp: , Rfl:   •  Cholecalciferol (VITAMIN D-3) 5000 units TABS, Take 11,000 Units by mouth daily  , Disp: , Rfl:   •  Ginger, Zingiber officinalis, (GINGER PO), Take by mouth, Disp: , Rfl:   •  Green Tea, Coby sinensis, (GREEN TEA EXTRACT PO), Take by mouth daily, Disp: , Rfl:   •  Multiple Vitamin (multivitamin) tablet, Take 1 tablet by mouth daily, Disp: , Rfl:   •  VITAMIN K PO, Take 400 mg by mouth  , Disp: , Rfl:   •  zinc gluconate 50 mg tablet, Take 15 mg by mouth daily, Disp: , Rfl:   •  Ascorbic Acid (VITAMIN C PO), Take by mouth 1260mg daily (Patient not taking: Reported on 11/12/2021), Disp: , Rfl:   •  aspirin (ECOTRIN LOW STRENGTH) 81 mg EC tablet, Take 81 mg by mouth 2 (two) times a week   (Patient not taking: Reported on 6/2/2022), Disp: , Rfl:   •  other medication, see sig,, Medication/product name: Quersetin   Strength: 500mg Sig (include dose, route, frequency): QD PO, Disp: , Rfl:   •  TiZANidine (ZANAFLEX) 2 MG capsule, Take 1 capsule (2 mg total) by mouth daily at bedtime as needed for muscle spasms (Patient not taking: Reported on 11/5/2021), Disp: 30 capsule, Rfl: 0    No Known Allergies    Vitals:    05/11/23 1355   BP: 128/82   Pulse: 88   Resp: 18   Temp: (!) 97 4 °F (36 3 °C)   SpO2: 97%     Physical Exam  Constitutional:       Appearance: He is well-developed  Comments: Well-nourished middle-aged male, no respiratory distress, no signs of pain   HENT:      Head: Normocephalic and atraumatic  Right Ear: External ear normal       Left Ear: External ear normal    Eyes:      Conjunctiva/sclera: Conjunctivae normal       Pupils: Pupils are equal, round, and reactive to light  Cardiovascular:      Rate and Rhythm: Normal rate and regular rhythm  Heart sounds: Normal heart sounds  Pulmonary:      Effort: Pulmonary effort is normal       Breath sounds: Normal breath sounds     Abdominal:      General: Bowel sounds are normal       Palpations: Abdomen is soft       Comments: Soft, nontender, +bowel sounds, cannot palpate liver or spleen, no guarding, no rigidity or rebound   Musculoskeletal:         General: Normal range of motion  Cervical back: Normal range of motion and neck supple  Skin:     General: Skin is warm  Comments: Good color, warm, moist, no petechiae or ecchymoses   Neurological:      Mental Status: He is alert and oriented to person, place, and time  Deep Tendon Reflexes: Reflexes are normal and symmetric  Psychiatric:         Behavior: Behavior normal          Thought Content: Thought content normal          Judgment: Judgment normal      Extremities:  No lower extremity edema bilaterally, no cords, pulses are 1+ bilaterally  Lymphatics:  No adenopathy in the neck, supraclavicular region, axilla and groin bilaterally    Labs        3/9/2023 WBC = 5 81 hemoglobin = 14 6 hematocrit = 44 platelet = 266 neutrophil = 56% BUN = 25 creatinine = 0 81 calcium = 9 1 LFTs WNL    05/05/2022 WBC = 6 2 hemoglobin = 14 3 hematocrit = 42 platelet = 809 neutrophil = 55% BUN = 23 creatinine = 0 73 calcium = 9 1 LFTs WNL    Imaging    3/16/2023 bi-parametric MRI of prostate ([place])/   Impression stated that the by parametric MRI was negative for significant or index lesion, intermediate or high-grade neoplastic disease  Peripheral zone demonstrates no concordant focal abnormality with changes compatible with chronic inflammation or prostatitis  Transitional zone demonstrates an enlarged median lobe and highly organized BPH nodules and enlarged normal size gland  Recommend continued surveillance  PI-RADS category 2 (low, clinically significant cancer unlikely)  02/25/2021 [facility] [address] [place] [address] MRI prostate without contrast   Impression stated MRI negative for significant or index lesion, indeterminate or high-grade neoplastic disease  Negative for concordant target lesion    Negative for neurovascular bundle involvement, extracapsular extension, seminal vesicle extension, pathologic lymphadenopathy or bone metastases  Transitional zone demonstrates an enlarged median lobe and BPH nodules in an enlarged gland  Peripheral zone negative for concurrent focal abnormality with bilateral, symmetrical, homogeneous decrease signal intensity compatible with chronic inflammation or prostatitis  Recommend continued surveillance with repeat PSA at 6 and 12 months, recommend repeat bpMRI in 12 months  Final impression stated PI-RADS category 2, clinically significant cancer is unlikely to be present  Pathology    Case Report   Surgical Pathology Report                         Case: [id number]                                    Authorizing Provider: Katerin Chamberlain MD           Collected:           12/29/2021 0706               Ordering Location:     [facility]      Received:            12/29/2021 [facility]                                      [facility]                                                                                   Laboratory                                                                    Pathologist:           Juanis Sánchez MD                                                    Specimen:    Prostate, Prostate Biopsy ( 27 slides [id number] [facility], collected                        9/12/2017)                                                                                 Final Diagnosis   Prostate, Prostate Biopsy ( 27 slides [id number] [facility], collected 9/12/2017):     A  LEFT APEX, NEEDLE BIOPSY:  - Prostatic adenocarcinoma, acinar type, Joe score 3 + 3 = 6, Prognostic Grade Group 1, continuously involving 2 of 2 cores (10%, 5%)  - Perineural invasion: Not identified      B  LEFT MID, NEEDLE BIOPSY:  - Benign prostate tissue      C  LEFT BASE, NEEDLE BIOPSY:  - Benign prostate tissue      D   LEFT LATERAL APEX, NEEDLE BIOPSY:  - Prostatic adenocarcinoma, acinar type, Clopton score 3 + 3 = 6, Prognostic Grade Group 1, continuously involving 1 of 2 cores (5%, 0%)  - Perineural invasion: Not identified      E  LEFT LATERAL MED, NEEDLE BIOPSY:  - Benign prostate tissue      F  LEFT LATERAL BASE, NEEDLE BIOPSY:  - Benign prostate tissue      G  RIGHT APEX, NEEDLE BIOPSY:  - Benign prostate tissue      H  RIGHT MID, NEEDLE BIOPSY:  - Benign prostate tissue      I  RIGHT BASE, NEEDLE BIOPSY:  - Benign prostate tissue      J  RIGHT LATERAL APEX, NEEDLE BIOPSY:  - Benign prostate tissue      K  RIGHT LATERAL MID, NEEDLE BIOPSY:  - Benign prostate tissue      L  RIGHT LATERAL BASE, NEEDLE BIOPSY  - Benign prostate tissue     Electronically signed by Phani Phelan MD on 12/29/2021 at 11:15 AM

## 2023-07-28 ENCOUNTER — APPOINTMENT (OUTPATIENT)
Dept: LAB | Facility: CLINIC | Age: 68
End: 2023-07-28
Payer: MEDICARE

## 2023-11-03 ENCOUNTER — OFFICE VISIT (OUTPATIENT)
Dept: URGENT CARE | Facility: CLINIC | Age: 68
End: 2023-11-03
Payer: MEDICARE

## 2023-11-03 VITALS
RESPIRATION RATE: 18 BRPM | OXYGEN SATURATION: 98 % | DIASTOLIC BLOOD PRESSURE: 82 MMHG | HEIGHT: 70 IN | WEIGHT: 168 LBS | HEART RATE: 81 BPM | TEMPERATURE: 97.9 F | BODY MASS INDEX: 24.05 KG/M2 | SYSTOLIC BLOOD PRESSURE: 128 MMHG

## 2023-11-03 DIAGNOSIS — M70.22 OLECRANON BURSITIS OF LEFT ELBOW: Primary | ICD-10-CM

## 2023-11-03 PROCEDURE — 99213 OFFICE O/P EST LOW 20 MIN: CPT | Performed by: PHYSICIAN ASSISTANT

## 2023-11-03 RX ORDER — NAPROXEN 500 MG/1
500 TABLET ORAL 2 TIMES DAILY WITH MEALS
Qty: 10 TABLET | Refills: 0 | Status: SHIPPED | OUTPATIENT
Start: 2023-11-03 | End: 2023-11-08

## 2023-11-03 NOTE — PATIENT INSTRUCTIONS
Elbow Bursitis   WHAT YOU NEED TO KNOW:   Elbow bursitis is inflammation of the bursa in your elbow. The bursa is a fluid-filled sac that acts as a cushion between a bone and a tendon. A tendon is a cord of strong tissue that connects muscles to bones. The bursa is located right under the point of your elbow. DISCHARGE INSTRUCTIONS:   Call your doctor if:   Your pain and swelling increase. Your symptoms do not improve after 10 days of treatment. You have a fever. You have questions or concerns about your condition or care. Medicines: You may need any of the following:  NSAIDs , such as ibuprofen, help decrease swelling, pain, and fever. NSAIDs can cause stomach bleeding or kidney problems in certain people. If you take blood thinner medicine, always ask your healthcare provider if NSAIDs are safe for you. Always read the medicine label and follow directions. Aspirin  helps relieve pain and swelling. Take aspirin exactly as directed by your healthcare provider. Antibiotics  help fight an infection caused by bacteria. Steroids  help decrease pain and swelling. Steroids may be given for a short time to relieve acute pain. Take your medicine as directed. Contact your healthcare provider if you think your medicine is not helping or if you have side effects. Tell your provider if you are allergic to any medicine. Keep a list of the medicines, vitamins, and herbs you take. Include the amounts, and when and why you take them. Bring the list or the pill bottles to follow-up visits. Carry your medicine list with you in case of an emergency. Manage elbow bursitis:   Rest your elbow as much as possible to decrease pain and swelling. Slowly start to do more each day. Return to your daily activities as directed. Do not  bend your elbow all the way or lift anything heavy while your elbow is healing.  An occupational therapist can help you find ways to keep your elbow rested that will help with the type of work you do. For example, arm rests that do not touch the elbow can make it easier to work at a computer. Use an elbow pad while your elbow heals. An elbow pad will cushion and protect your elbow. Your healthcare provider can tell you the kind of elbow pad to use. He or she can also tell you how long to wear it each day, and for how many days to use it. Apply ice to help decrease swelling and pain. Use an ice pack, or put crushed ice in a plastic bag. Cover the bag with a towel before you place it on your elbow. Apply ice for 15 to 20 minutes, 3 to 4 times each day, as directed. Use compression to help decrease swelling. Healthcare providers may wrap your arm with tape or an elastic bandage. Loosen the elastic bandage if you start to lose feeling in your fingers. Elevate (raise) your elbow above the level of your heart as often as you can. This will help decrease swelling and pain. Prop your elbow on pillows or blankets to keep it elevated comfortably. Go to physical therapy, if directed. A physical therapist teaches you exercises to help improve movement and strength, and to decrease pain. Prevent elbow bursitis:   Avoid injury and pressure to your elbows. Wear elbow pads or protectors when you play sports. Do not lean on your elbows or clench your fists. Do not tightly  small items, such as tools or pens. Stretch, warm up, and cool down. Always stretch and do warmup and cool-down exercises before and after you exercise. This will help loosen your muscles and decrease stress on your elbow. Rest between workouts. Follow up with your doctor as directed:  Write down your questions so you remember to ask them during your visits. © Copyright [facility] 2023 Information is for End User's use only and may not be sold, redistributed or otherwise used for commercial purposes. The above information is an  only.  It is not intended as medical advice for individual conditions or treatments. Talk to your doctor, nurse or pharmacist before following any medical regimen to see if it is safe and effective for you. Left olecranon bursitis:   -There is no sign of infection on exam today, will ace wrap the elbow and start the patient on naproxen 500mg PO BID PRN pain. Take with meals. -Ice the area for 20 minutes 3-4 times a day  -Elevate the area and rest   -Avoid strenuous activity/sports or gym until your symptoms improve. Avoid leaning onto the elbow.   -Follow up with  For further evaluation and management. If you experience warmth, redness or worsening pain/swelling, fever/chills follow up immediately.

## 2023-11-03 NOTE — PROGRESS NOTES
[facility]        NAME: Vaughn Hernandez is a 79 y.o. male  : 1955    MRN: [id number]  DATE: November 3, 2023  TIME: 10:20 AM    Assessment and Plan   Olecranon bursitis of left elbow [M70.22]  1. Olecranon bursitis of left elbow  naproxen (NAPROSYN) 500 mg tablet            Patient Instructions   Left olecranon bursitis:   -There is no sign of infection on exam today, will ace wrap the elbow and start the patient on naproxen 500mg PO BID PRN pain. Take with meals. -Ice the area for 20 minutes 3-4 times a day  -Elevate the area and rest   -Avoid strenuous activity/sports or gym until your symptoms improve. Avoid leaning onto the elbow.   -Follow up with  For further evaluation and management. If you experience warmth, redness or worsening pain/swelling, fever/chills follow up immediately. Follow up with PCP in 3-5 days. Proceed to  ER if symptoms worsen. Chief Complaint     Chief Complaint   Patient presents with    Elbow Swelling     Pt here for left  elbow swelling, on going x few days pt states  no pain, just large swelling at the joint. History of Present Illness       The patient is a 61-year-old male who presents today for L sided elbow edema x 2 days. He states that he has soft edema over the olecranon. He states that there is no erythema or bruising. There was no trauma or injury. No oozing or drainage from the area. He has full ROM of the elbow without pain. No fever or chills. No OTC measures. Review of Systems   Review of Systems   Constitutional:  Negative for activity change, appetite change, chills, fatigue and fever. Musculoskeletal:  Positive for arthralgias and joint swelling. Negative for back pain, gait problem, myalgias, neck pain and neck stiffness. Skin:  Negative for color change, pallor, rash and wound. Allergic/Immunologic: Negative for immunocompromised state.    Neurological:  Negative for dizziness, weakness, light-headedness and numbness. Hematological:  Does not bruise/bleed easily. Current Medications       Current Outpatient Medications:     Cholecalciferol (VITAMIN D-3) 5000 units TABS, Take 11,000 Units by mouth daily  , Disp: , Rfl:     Chelsie, Zingiber officinalis, (CHELSIE PO), Take by mouth, Disp: , Rfl:     Green Tea, Coby sinensis, (GREEN TEA EXTRACT PO), Take by mouth daily, Disp: , Rfl:     Multiple Vitamin (multivitamin) tablet, Take 1 tablet by mouth daily, Disp: , Rfl:     naproxen (NAPROSYN) 500 mg tablet, Take 1 tablet (500 mg total) by mouth 2 (two) times a day with meals for 5 days, Disp: 10 tablet, Rfl: 0    VITAMIN K PO, Take 400 mg by mouth  , Disp: , Rfl:     aspirin (ECOTRIN LOW STRENGTH) 81 mg EC tablet, Take 81 mg by mouth 2 (two) times a week   (Patient not taking: Reported on 6/2/2022), Disp: , Rfl:     zinc gluconate 50 mg tablet, Take 15 mg by mouth daily, Disp: , Rfl:     Current Allergies     Allergies as of 11/03/2023    (No Known Allergies)            The following portions of the patient's history were reviewed and updated as appropriate: allergies, current medications, past family history, past medical history, past social history, past surgical history and problem list.     Past Medical History:   Diagnosis Date    Cancer ([facility])     prostate cancer zoey 6    Dysuria     last assessed 7/30/14    Elevated PSA     resolved 3/24/17    Hx of removal of neck cyst     last assessed 9/8/15    Prostate cancer ([facility])        History reviewed. No pertinent surgical history. Family History   Problem Relation Age of Onset    Leukemia Mother     Multiple sclerosis Mother     Multiple sclerosis Sister          Medications have been verified. Objective   /82   Pulse 81   Temp 97.9 °F (36.6 °C)   Resp 18   Ht 5' 10" (1.778 m)   Wt 76.2 kg (168 lb)   SpO2 98%   BMI 24.11 kg/m²   No LMP for male patient. Physical Exam     Physical Exam  Vitals and nursing note reviewed. Constitutional:       General: He is not in acute distress. Appearance: He is well-developed. He is not diaphoretic. Cardiovascular:      Rate and Rhythm: Normal rate and regular rhythm. Heart sounds: Normal heart sounds. Pulmonary:      Effort: Pulmonary effort is normal.      Breath sounds: Normal breath sounds. Musculoskeletal:      Right elbow: Normal.      Left elbow: Swelling present. No deformity, effusion or lacerations. Normal range of motion. Tenderness present. Comments: Left Elbow: There is soft edema over the olecranon process, mild tenderness. No warmth or erythema. The skin is intact. He has full ROM of the elbow without pain. Strength and sensation is grossly intact. Skin:     General: Skin is warm and dry. Capillary Refill: Capillary refill takes less than 2 seconds. Findings: No abrasion, bruising, ecchymosis, erythema or wound. Neurological:      Sensory: No sensory deficit. Motor: No abnormal muscle tone.       Gait: Gait normal.   Psychiatric:         Behavior: Behavior normal.

## 2023-11-07 ENCOUNTER — APPOINTMENT (OUTPATIENT)
Dept: LAB | Facility: CLINIC | Age: 68
End: 2023-11-07
Payer: MEDICARE

## 2023-11-07 LAB — PSA SERPL-MCNC: 12.68 NG/ML (ref 0–4)

## 2024-02-26 ENCOUNTER — TELEPHONE (OUTPATIENT)
Dept: HEMATOLOGY ONCOLOGY | Facility: CLINIC | Age: 69
End: 2024-02-26

## 2024-02-26 NOTE — TELEPHONE ENCOUNTER
Patient Call    Who are you speaking with? Patient    If it is not the patient, are they listed on an active communication consent form? Yes   What is the reason for this call? Question about imaging order   Does this require a call back? Yes   If a call back is required, please list best call back number [phone]    If a call back is required, advise that a message will be forwarded to their care team and someone will return their call as soon as possible.   Did you relay this information to the patient? Yes

## 2024-03-11 ENCOUNTER — APPOINTMENT (OUTPATIENT)
Dept: LAB | Facility: CLINIC | Age: 69
End: 2024-03-11
Payer: MEDICARE

## 2024-03-22 ENCOUNTER — TELEPHONE (OUTPATIENT)
Dept: HEMATOLOGY ONCOLOGY | Facility: CLINIC | Age: 69
End: 2024-03-22

## 2024-03-22 NOTE — TELEPHONE ENCOUNTER
Appointment Change  Cancel, Reschedule, Change to Virtual      Who are you speaking with? Patient   If it is not the patient, is the caller listed on the communication consent form? N/A   Which provider is the appointment scheduled with? Dr. De La Cruz   When was the original appointment scheduled?    Please list date and time 5/14/24 1:20 PM   At which location is the appointment scheduled to take place? [facility]   Was the appointment rescheduled?     Was the appointment changed from an in person visit to a virtual visit?    If so, please list the details of the change. 5/21/24 4:00 PM   What is the reason for the appointment change? Provider requested change due to scheduling conflict.       Was STAR transport scheduled? No   Does STAR transport need to be scheduled for the new visit (if applicable) No   Does the patient need an infusion appointment rescheduled? No   Does the patient have an upcoming infusion appointment scheduled? If so, when? No   Is the patient undergoing chemotherapy? No   For appointments cancelled with less than 24 hours:  Was the no-show policy reviewed? Yes

## 2024-04-05 ENCOUNTER — TELEPHONE (OUTPATIENT)
Dept: HEMATOLOGY ONCOLOGY | Facility: CLINIC | Age: 69
End: 2024-04-05

## 2024-04-05 NOTE — TELEPHONE ENCOUNTER
Patient Call    Who are you speaking with? Patient    If it is not the patient, are they listed on an active communication consent form? N/A   What is the reason for this call? Pt is calling in regards to a prescription he received for an MRI last May. Pt is scheduled to have the MRI done on 4/8/24 near [facility]. The current script says with and without contrast. Pt states the script should be without contrast. The facility said they need the script to be amended to only say without contrast. Pt would like a call back today regarding this please.     Does this require a call back? Yes   If a call back is required, please list best call back number [phone]   If a call back is required, advise that a message will be forwarded to their care team and someone will return their call as soon as possible.   Did you relay this information to the patient? Yes

## 2024-04-29 ENCOUNTER — APPOINTMENT (OUTPATIENT)
Dept: LAB | Facility: CLINIC | Age: 69
End: 2024-04-29
Payer: MEDICARE

## 2024-05-01 ENCOUNTER — TELEPHONE (OUTPATIENT)
Dept: HEMATOLOGY ONCOLOGY | Facility: CLINIC | Age: 69
End: 2024-05-01

## 2024-05-01 NOTE — TELEPHONE ENCOUNTER
Medical Records Request   Who are you speaking with? Physician Office   If it is not the patient, are they listed on an active communication consent form? Yes   What is the purpose of this call? Requesting medical records   What records are being requested? Reports   Details/ Additional Info Biopsy report of prostate 2014, lab recent PSA   Which provider does the patient see? Dr. De La Cruz   Fax Number   Person's name (Attention:)   [phone] Attn: Catalina   Facility call back number [phone]    Patient call back number na

## 2024-05-21 ENCOUNTER — OFFICE VISIT (OUTPATIENT)
Dept: HEMATOLOGY ONCOLOGY | Facility: CLINIC | Age: 69
End: 2024-05-21
Payer: MEDICARE

## 2024-05-21 ENCOUNTER — DOCUMENTATION (OUTPATIENT)
Dept: HEMATOLOGY ONCOLOGY | Facility: CLINIC | Age: 69
End: 2024-05-21

## 2024-05-21 VITALS
SYSTOLIC BLOOD PRESSURE: 148 MMHG | BODY MASS INDEX: 23.05 KG/M2 | OXYGEN SATURATION: 97 % | WEIGHT: 161 LBS | RESPIRATION RATE: 17 BRPM | HEIGHT: 70 IN | TEMPERATURE: 97.6 F | DIASTOLIC BLOOD PRESSURE: 82 MMHG | HEART RATE: 85 BPM

## 2024-05-21 DIAGNOSIS — C61 PROSTATE CANCER (HCC): Primary | ICD-10-CM

## 2024-05-21 PROCEDURE — 99214 OFFICE O/P EST MOD 30 MIN: CPT | Performed by: INTERNAL MEDICINE

## 2024-05-21 NOTE — PROGRESS NOTES
Lucio Torres  1955  [address] [facility] [place]  [address]  [place] [id number]    DISCUSSION/SUMMARY:    68-year-old male diagnosed with prostate adenocarcinoma (1+ core, Joe score of 6) in 2015.  The plan since 2015 has been surveillance.  Mr. Torres continues to feel well and clinically there are no concerning findings.  Patient has no  issues.  Recent CBC results and CMP were good/acceptable.  Recent bi-parametric MRI did not demonstrate any evidence for progression.  PSA level is minimally lower than before.    The plan is to continue with surveillance.  Patient will undergo a repeat biparametric MRI of the prostate without contrast next May 2025. Patient will return to the oncology office in April-May 2025.  Patient follows up with Dr. Lincoln Leal (urologist in [facility]) also.  He reads the mpMRI/pelvis.  Dr. Leal has requested the patient have a PSA performed approximately every 6 months at least for the next year - standing order was given.    Axumin PET-CT or PSMA PET-CT not indicated at this time.    Mr. Torres knows to call the hematology/oncology office if there are any other questions or concerns or if he develops any  issues, body aches, unplanned weight loss, hematuria etc.    Carefully review your medication list and verify that the list is accurate and up-to-date. Please call the hematology/oncology office if there are medications missing from the list, medications on the list that you are not currently taking or if there is a dosage or instruction that is different from how you're taking that medication.    Patient goals and areas of care: mpMRI prostate March 2024, serial PSAs  Barriers to care:  None  Patient is able to self-care  ______________________________________________________________________________________    No chief complaint on file.    History of Present Illness:  68-year-old male with a somewhat  complicated prostate cancer history referred for oncology evaluation.    Mr. Torres was diagnosed with a urinary tract infection in 2015.  At that time patient was found to have an elevated PSA.  Patient received antibiotics and the UTI resolved.  Patient was subsequently followed by Urology; PSA remained elevated.  Patient underwent a 12 core biopsy.  Reportedly 1 of the 12 cores demonstrated Joe 6 adenocarcinoma.  There was no evidence of locally advanced or metastatic disease - patient started surveillance.  Patient states that his PSA initially was in the 3-4 range but more recently has been in the 8-10 range.    Mr. Torres has been followed by Dr. Best; patient has also been followed by a urologist in [place], Dr. Lincoln Leal.  Patient underwent a 2nd biopsy approximately 3 years ago.  Mr. Torres states that the 2nd pathology results were about the same as before, no evidence of disease progression.    The plan up to this point has been repeating the multiparamedic MRIs without contrast.  The scans are read by Dr. Leal in the [place]; patient either then has a direct appointment with Dr. Leal or patient has a tele visit.  As above, patient also follows with Dr. Best.  As per patient, recently there has been a difference of opinion as far as repeating a prostate biopsy (or not).    Mr. Torres is about the same as before.    Weight is stable in 160s.   No body aches.    Pt has occasional urgency at night.  No dysuria no hematuria, no incontinence.   Patient continues to be very active. Pt works a lot outside, does 2-3 hrs of physical work a day.   Routine health maintenance and medical care is up-to-date except for pending colonoscopy or colo guard    Review of Systems   Constitutional: Negative.  Negative for appetite change, fever and unexpected weight change.   HENT: Negative.     Eyes: Negative.    Respiratory: Negative.     Cardiovascular: Negative.    Gastrointestinal: Negative.     Endocrine: Negative.    Genitourinary:  Positive for difficulty urinating and urgency. Negative for dysuria and hematuria.   Musculoskeletal: Negative.    Skin: Negative.    Allergic/Immunologic: Negative.    Neurological: Negative.  Negative for dizziness and syncope.   Hematological: Negative.    Psychiatric/Behavioral: Negative.     All other systems reviewed and are negative.    Patient Active Problem List   Diagnosis    Acrochordon    Hypercholesteremia    Prostate cancer (HCC)    Annual physical exam    Radicular pain of upper extremity    Primary hypertension     Past Medical History:   Diagnosis Date    Cancer (HCC)     prostate cancer zoey 6    Dysuria     last assessed 7/30/14    Elevated PSA     resolved 3/24/17    Hx of removal of neck cyst     last assessed 9/8/15    Prostate cancer (HCC)      Past surgical history:  No prior blood transfusions    Family History   Problem Relation Age of Onset    Leukemia Mother     Multiple sclerosis Mother     Multiple sclerosis Sister    Family history:  No known familial or genetic diseases including prostate cancer    Social History     Socioeconomic History    Marital status: /Civil Union     Spouse name: Not on file    Number of children: Not on file    Years of education: Not on file    Highest education level: Not on file   Occupational History    Not on file   Tobacco Use    Smoking status: Former    Smokeless tobacco: Never   Vaping Use    Vaping status: Never Used   Substance and Sexual Activity    Alcohol use: Yes    Drug use: No    Sexual activity: Not on file   Other Topics Concern    Not on file   Social History Narrative    Not on file     Social Determinants of Health     Financial Resource Strain: Not on file   Food Insecurity: Not on file   Transportation Needs: Not on file   Physical Activity: Not on file   Stress: Not on file   Social Connections: Not on file   Intimate Partner Violence: Not on file   Housing Stability: Not on file    Social history:  No tobacco, alcohol or drug abuse, no toxic exposure    Current Outpatient Medications:     aspirin (ECOTRIN LOW STRENGTH) 81 mg EC tablet, Take 81 mg by mouth 2 (two) times a week   (Patient not taking: Reported on 6/2/2022), Disp: , Rfl:     Cholecalciferol (VITAMIN D-3) 5000 units TABS, Take 11,000 Units by mouth daily  , Disp: , Rfl:     Chelsie, Zingiber officinalis, (CHELSIE PO), Take by mouth, Disp: , Rfl:     Green Tea, Coby sinensis, (GREEN TEA EXTRACT PO), Take by mouth daily, Disp: , Rfl:     Multiple Vitamin (multivitamin) tablet, Take 1 tablet by mouth daily, Disp: , Rfl:     naproxen (NAPROSYN) 500 mg tablet, Take 1 tablet (500 mg total) by mouth 2 (two) times a day with meals for 5 days, Disp: 10 tablet, Rfl: 0    VITAMIN K PO, Take 400 mg by mouth  , Disp: , Rfl:     zinc gluconate 50 mg tablet, Take 15 mg by mouth daily, Disp: , Rfl:     No Known Allergies    There were no vitals filed for this visit.    Physical Exam  Constitutional:       Appearance: Normal appearance. He is well-developed.      Comments: Well-nourished middle-aged male, no respiratory distress, no signs of pain   HENT:      Head: Normocephalic and atraumatic.      Right Ear: External ear normal.      Left Ear: External ear normal.      Nose: Nose normal.   Eyes:      General: No scleral icterus.     Conjunctiva/sclera: Conjunctivae normal.      Pupils: Pupils are equal, round, and reactive to light.   Neck:      Vascular: No carotid bruit.   Cardiovascular:      Rate and Rhythm: Normal rate and regular rhythm.      Heart sounds: Normal heart sounds.   Pulmonary:      Effort: Pulmonary effort is normal.      Breath sounds: Normal breath sounds.   Abdominal:      Palpations: Abdomen is soft.      Tenderness: There is no abdominal tenderness. There is no guarding.      Comments: Soft, nontender, +bowel sounds, cannot palpate liver or spleen, no guarding, no rigidity or rebound   Musculoskeletal:         General:  Normal range of motion.      Cervical back: Normal range of motion and neck supple. No rigidity or tenderness.   Lymphadenopathy:      Cervical: No cervical adenopathy.   Skin:     General: Skin is warm.      Capillary Refill: Capillary refill takes less than 2 seconds.      Comments: Good color, warm, moist, no petechiae or ecchymoses   Neurological:      Mental Status: He is alert and oriented to person, place, and time.      Deep Tendon Reflexes: Reflexes are normal and symmetric.   Psychiatric:         Mood and Affect: Mood normal.         Behavior: Behavior normal.         Thought Content: Thought content normal.         Judgment: Judgment normal.       Lymphatics:  No adenopathy in the neck, supraclavicular region    Labs    Component      Latest Ref Rng 3/10/2022 3/23/2022 5/5/2022 3/9/2023 7/28/2023 11/7/2023 3/11/2024   PSA      0.00 - 4.00 ng/mL 15.5 (H)  13.3 (H)  10.0 (H)  12.2 (H)  12.49 (H)  12.68 (H)  13.95 (H)      Component      Latest Ref Rng 4/29/2024   PSA      0.00 - 4.00 ng/mL 12.12 (H)       Latest Reference Range & Units 03/09/23 09:29   WBC 4.31 - 10.16 Thousand/uL 5.81   RBC 3.88 - 5.62 Million/uL 4.96   Hemoglobin 12.0 - 17.0 g/dL 14.6   Hematocrit 36.5 - 49.3 % 43.5   MCV 82 - 98 fL 88   MCH 26.8 - 34.3 pg 29.4   MCHC 31.4 - 37.4 g/dL 33.6   RDW 11.6 - 15.1 % 13.9   Platelet Count 149 - 390 Thousands/uL 384   MPV 8.9 - 12.7 fL 10.3   nRBC /100 WBCs 0   Segmented % 43 - 75 % 56   Lymphocytes % 14 - 44 % 30   Monocytes % 4 - 12 % 10   Eosinophils % 0 - 6 % 3   Basophils % 0 - 1 % 1   Immature Grans % 0 - 2 % 0   Absolute Immature Grans 0.00 - 0.20 Thousand/uL 0.01   Absolute Neutrophils 1.85 - 7.62 Thousands/µL 3.24   Absolute Lymphocytes 0.60 - 4.47 Thousands/µL 1.75   Absolute Monocytes 0.17 - 1.22 Thousand/µL 0.58   Absolute Eosinophils 0.00 - 0.61 Thousand/µL 0.15   Absolute Basophils 0.00 - 0.10 Thousands/µL 0.08      Latest Reference Range & Units 03/09/23 09:29   Sodium 135 - 147  mmol/L 139   Potassium 3.5 - 5.3 mmol/L 4.8   Chloride 96 - 108 mmol/L 107   Carbon Dioxide 21 - 32 mmol/L 28   ANION GAP 4 - 13 mmol/L 4   BUN 5 - 25 mg/dL 25   Creatinine 0.60 - 1.30 mg/dL 0.81   GLUCOSE, FASTING 65 - 99 mg/dL 94   Calcium 8.3 - 10.1 mg/dL 9.1   AST 5 - 45 U/L 27   ALT 12 - 78 U/L 50   ALK PHOS 46 - 116 U/L 60   Total Protein 6.4 - 8.4 g/dL 6.9   Albumin 3.5 - 5.0 g/dL 3.6   Total Bilirubin 0.20 - 1.00 mg/dL 0.47   GFR, Calculated ml/min/1.73sq m 92         3/9/2023 WBC = 5.81 hemoglobin = 14.6 hematocrit = 44 platelet = 384 neutrophil = 56% BUN = 25 creatinine = 0.81 calcium = 9.1 LFTs WNL    05/05/2022 WBC = 6.2 hemoglobin = 14.3 hematocrit = 42 platelet = 350 neutrophil = 55% BUN = 23 creatinine = 0.73 calcium = 9.1 LFTs WNL    Imaging  [id number] MRI Pelvis w/o Contrast     3/16/2023 bi-parametric MRI of prostate ([place])/.  Impression stated that the by parametric MRI was negative for significant or index lesion, intermediate or high-grade neoplastic disease.  Peripheral zone demonstrates no concordant focal abnormality with changes compatible with chronic inflammation or prostatitis.  Transitional zone demonstrates an enlarged median lobe and highly organized BPH nodules and enlarged normal size gland.  Recommend continued surveillance.  PI-RADS category 2 (low, clinically significant cancer unlikely).            02/25/2021 3T Open [facility] - biparametric MRI prostate without contrast.  Impression stated MRI negative for significant or index lesion, indeterminate or high-grade neoplastic disease.  Negative for concordant target lesion.  Negative for neurovascular bundle involvement, extracapsular extension, seminal vesicle extension, pathologic lymphadenopathy or bone metastases.  Transitional zone demonstrates an enlarged median lobe and BPH nodules in an enlarged gland.  Peripheral zone negative for concurrent focal abnormality with bilateral, symmetrical, homogeneous  decrease signal intensity compatible with chronic inflammation or prostatitis.  Recommend continued surveillance with repeat PSA at 6 and 12 months, recommend repeat bpMRI in 12 months.  Final impression stated PI-RADS category 2, clinically significant cancer is unlikely to be present.    Pathology    Case Report   Surgical Pathology Report                         Case: [id number]                                    Authorizing Provider:  Hai De La Cruz MD           Collected:           12/29/2021 [id number]               Ordering Location:     [facility]      Received:            12/29/2021 [facility]                                      Hospital Specialty                                                                                   Laboratory                                                                    Pathologist:           Sudhir Burnham MD                                                    Specimen:    Prostate, Prostate Biopsy ( 27 slides [id number] [facility], collected                        9/12/2017)                                                                                 Final Diagnosis   Prostate, Prostate Biopsy ( 27 slides [id number] [facility], collected 9/12/2017):     A. LEFT APEX, NEEDLE BIOPSY:  - Prostatic adenocarcinoma, acinar type, Joe score 3 + 3 = 6, Prognostic Grade Group 1, continuously involving 2 of 2 cores (10%, 5%).  - Perineural invasion: Not identified.     B. LEFT MID, NEEDLE BIOPSY:  - Benign prostate tissue.     C. LEFT BASE, NEEDLE BIOPSY:  - Benign prostate tissue.     D. LEFT LATERAL APEX, NEEDLE BIOPSY:  - Prostatic adenocarcinoma, acinar type, Joe score 3 + 3 = 6, Prognostic Grade Group 1, continuously involving 1 of 2 cores (5%, 0%).  - Perineural invasion: Not identified.     E. LEFT LATERAL MED, NEEDLE BIOPSY:  - Benign prostate tissue.     F. LEFT LATERAL BASE, NEEDLE BIOPSY:  - Benign prostate tissue.     G. RIGHT APEX, NEEDLE  BIOPSY:  - Benign prostate tissue.     H. RIGHT MID, NEEDLE BIOPSY:  - Benign prostate tissue.     I.   RIGHT BASE, NEEDLE BIOPSY:  - Benign prostate tissue.     J. RIGHT LATERAL APEX, NEEDLE BIOPSY:  - Benign prostate tissue.     K. RIGHT LATERAL MID, NEEDLE BIOPSY:  - Benign prostate tissue.     L. RIGHT LATERAL BASE, NEEDLE BIOPSY  - Benign prostate tissue.   Electronically signed by Sudhir Burnham MD on 12/29/2021 at 11:15 AM

## 2024-06-03 ENCOUNTER — OFFICE VISIT (OUTPATIENT)
Dept: PLASTIC SURGERY | Facility: CLINIC | Age: 69
End: 2024-06-03
Payer: MEDICARE

## 2024-06-03 VITALS
SYSTOLIC BLOOD PRESSURE: 140 MMHG | DIASTOLIC BLOOD PRESSURE: 97 MMHG | HEIGHT: 70 IN | WEIGHT: 161 LBS | BODY MASS INDEX: 23.05 KG/M2 | HEART RATE: 80 BPM

## 2024-06-03 DIAGNOSIS — L98.9 SKIN LESION: Primary | ICD-10-CM

## 2024-06-03 PROCEDURE — 99203 OFFICE O/P NEW LOW 30 MIN: CPT | Performed by: PHYSICIAN ASSISTANT

## 2024-06-03 PROCEDURE — 11102 TANGNTL BX SKIN SINGLE LES: CPT | Performed by: PHYSICIAN ASSISTANT

## 2024-06-03 NOTE — PROGRESS NOTES
Assessment/Plan:     Diagnoses and all orders for this visit:    Skin lesion  Pt presents with a skin lesion mid-forehead which has been there for several months. Shave biopsy was performed today. We will see him back in 2 weeks to discuss his pathology.   -     Biopsy      Subjective:      Patient ID: Lucio Torres is a 69 y.o. male.    HPI    Pt presents with a skin lesion mid-forehead which has been there for several months. He thought it was a pimple but has not resolved. He denies drainage or bleeding. It is crusted. He does have a history of prostate cancer & HTN.     Patient Active Problem List   Diagnosis    Acrochordon    Hypercholesteremia    Prostate cancer (HCC)    Annual physical exam    Radicular pain of upper extremity    Primary hypertension    Skin lesion     No Known Allergies  Current Outpatient Medications on File Prior to Visit   Medication Sig    Edith, Zingiber officinalis, (EDITH PO) Take by mouth    Multiple Vitamin (multivitamin) tablet Take 1 tablet by mouth daily    VITAMIN K PO Take 400 mg by mouth      aspirin (ECOTRIN LOW STRENGTH) 81 mg EC tablet Take 81 mg by mouth 2 (two) times a week   (Patient not taking: Reported on 6/2/2022)    Cholecalciferol (VITAMIN D-3) 5000 units TABS Take 11,000 Units by mouth daily      Green Tea, Coby sinensis, (GREEN TEA EXTRACT PO) Take by mouth daily    naproxen (NAPROSYN) 500 mg tablet Take 1 tablet (500 mg total) by mouth 2 (two) times a day with meals for 5 days    zinc gluconate 50 mg tablet Take 15 mg by mouth daily     No current facility-administered medications on file prior to visit.     Family History   Problem Relation Age of Onset    Leukemia Mother     Multiple sclerosis Mother     Multiple sclerosis Sister      Past Medical History:   Diagnosis Date    Cancer (HCC)     prostate cancer zoey 6    Dysuria     last assessed 7/30/14    Elevated PSA     resolved 3/24/17    Hx of removal of neck cyst     last assessed 9/8/15     "Prostate cancer (HCC)      Social History     Socioeconomic History    Marital status: /Civil Union     Spouse name: None    Number of children: None    Years of education: None    Highest education level: None   Occupational History    None   Tobacco Use    Smoking status: Former    Smokeless tobacco: Never   Vaping Use    Vaping status: Never Used   Substance and Sexual Activity    Alcohol use: Yes    Drug use: No    Sexual activity: None   Other Topics Concern    None   Social History Narrative    None     Social Determinants of Health     Financial Resource Strain: Not on file   Food Insecurity: Not on file   Transportation Needs: Not on file   Physical Activity: Not on file   Stress: Not on file   Social Connections: Not on file   Intimate Partner Violence: Not on file   Housing Stability: Not on file     No past surgical history on file.      Review of Systems   All other systems reviewed and are negative.        Objective:      /97   Pulse 80   Ht 5' 10\" (1.778 m)   Wt 73 kg (161 lb)   BMI 23.10 kg/m²          Physical Exam  Constitutional:       Appearance: Normal appearance. He is well-developed.   HENT:      Head: Normocephalic and atraumatic.      Comments: Mid-forehead papule, crusted, see picture in media.   Eyes:      Conjunctiva/sclera: Conjunctivae normal.   Pulmonary:      Effort: Pulmonary effort is normal.   Musculoskeletal:         General: Normal range of motion.      Cervical back: Normal range of motion.   Skin:     General: Skin is warm and dry.   Neurological:      Mental Status: He is alert and oriented to person, place, and time.   Psychiatric:         Mood and Affect: Mood normal.         Behavior: Behavior normal.           Biopsy    Date/Time: 6/3/2024 2:30 PM    Performed by: Jailene Falk PA-C  Authorized by: Jailene Falk PA-C  Universal Protocol:  Consent: Verbal consent obtained.  Consent given by: patient    Procedure Details - Lesion Biopsy:     Body area:  " Head/neck    Head/neck location:  Forehead    Biopsy method: shave biopsy      Biopsy tissue type: skin

## 2024-06-26 ENCOUNTER — OFFICE VISIT (OUTPATIENT)
Dept: PLASTIC SURGERY | Facility: CLINIC | Age: 69
End: 2024-06-26

## 2024-06-26 DIAGNOSIS — B07.9 VERRUCA VULGARIS: Primary | ICD-10-CM

## 2024-06-26 NOTE — PROGRESS NOTES
Assessment/Plan:     Diagnoses and all orders for this visit:    Verruca vulgaris  He presented with a skin lesion mid-forehead. Shave biopsy was performed at his last visit. Biopsy showed verruca vulgaris, inflamed. Biopsy site is healing nicely. If the lesion returns he will contact us otherwise we will see him as needed.         Subjective:      Patient ID: Lucio Torres is a 69 y.o. male.    HPI    Pt is here to discuss his pathology. He presented with a skin lesion mid-forehead. Shave biopsy was performed at his last visit. Biopsy showed verruca vulgaris, inflamed.       Patient Active Problem List   Diagnosis    Acrochordon    Hypercholesteremia    Prostate cancer (HCC)    Annual physical exam    Radicular pain of upper extremity    Primary hypertension    Skin lesion     No Known Allergies  Current Outpatient Medications on File Prior to Visit   Medication Sig    aspirin (ECOTRIN LOW STRENGTH) 81 mg EC tablet Take 81 mg by mouth 2 (two) times a week   (Patient not taking: Reported on 6/2/2022)    Cholecalciferol (VITAMIN D-3) 5000 units TABS Take 11,000 Units by mouth daily      Ginger, Zingiber officinalis, (GINGER PO) Take by mouth    Green Tea, Coby sinensis, (GREEN TEA EXTRACT PO) Take by mouth daily    Multiple Vitamin (multivitamin) tablet Take 1 tablet by mouth daily    naproxen (NAPROSYN) 500 mg tablet Take 1 tablet (500 mg total) by mouth 2 (two) times a day with meals for 5 days    VITAMIN K PO Take 400 mg by mouth      zinc gluconate 50 mg tablet Take 15 mg by mouth daily     No current facility-administered medications on file prior to visit.     Family History   Problem Relation Age of Onset    Leukemia Mother     Multiple sclerosis Mother     Multiple sclerosis Sister      Past Medical History:   Diagnosis Date    Cancer (HCC)     prostate cancer zoey 6    Dysuria     last assessed 7/30/14    Elevated PSA     resolved 3/24/17    Hx of removal of neck cyst     last assessed 9/8/15     Prostate cancer (HCC)      Social History     Socioeconomic History    Marital status: /Civil Union     Spouse name: Not on file    Number of children: Not on file    Years of education: Not on file    Highest education level: Not on file   Occupational History    Not on file   Tobacco Use    Smoking status: Former    Smokeless tobacco: Never   Vaping Use    Vaping status: Never Used   Substance and Sexual Activity    Alcohol use: Yes    Drug use: No    Sexual activity: Not on file   Other Topics Concern    Not on file   Social History Narrative    Not on file     Social Determinants of Health     Financial Resource Strain: Not on file   Food Insecurity: Not on file   Transportation Needs: Not on file   Physical Activity: Not on file   Stress: Not on file   Social Connections: Not on file   Intimate Partner Violence: Not on file   Housing Stability: Not on file     No past surgical history on file.      Review of Systems   All other systems reviewed and are negative.        Objective:      There were no vitals taken for this visit.         Physical Exam  Constitutional:       Appearance: Normal appearance. He is well-developed.   HENT:      Head: Normocephalic and atraumatic.      Comments: Healing biopsy site mid-forehead.   Eyes:      Conjunctiva/sclera: Conjunctivae normal.   Pulmonary:      Effort: Pulmonary effort is normal.   Musculoskeletal:         General: Normal range of motion.      Cervical back: Normal range of motion.   Skin:     General: Skin is warm and dry.   Neurological:      Mental Status: He is alert and oriented to person, place, and time.   Psychiatric:         Mood and Affect: Mood normal.         Behavior: Behavior normal.

## 2024-11-14 ENCOUNTER — APPOINTMENT (OUTPATIENT)
Dept: LAB | Facility: CLINIC | Age: 69
End: 2024-11-14
Payer: MEDICARE

## 2024-11-14 DIAGNOSIS — C61 PROSTATE CANCER (HCC): ICD-10-CM

## 2024-11-21 ENCOUNTER — APPOINTMENT (OUTPATIENT)
Dept: LAB | Facility: CLINIC | Age: 69
End: 2024-11-21
Payer: MEDICARE

## 2024-11-21 DIAGNOSIS — C61 PROSTATE CANCER (HCC): ICD-10-CM

## 2024-11-21 LAB — PSA SERPL-MCNC: 17.74 NG/ML (ref 0–4)

## 2024-11-21 PROCEDURE — 36415 COLL VENOUS BLD VENIPUNCTURE: CPT

## 2024-11-21 PROCEDURE — 84153 ASSAY OF PSA TOTAL: CPT

## 2025-02-04 ENCOUNTER — TELEPHONE (OUTPATIENT)
Age: 70
End: 2025-02-04

## 2025-02-28 ENCOUNTER — RA CDI HCC (OUTPATIENT)
Dept: OTHER | Facility: HOSPITAL | Age: 70
End: 2025-02-28

## 2025-03-07 ENCOUNTER — OFFICE VISIT (OUTPATIENT)
Age: 70
End: 2025-03-07

## 2025-03-07 VITALS
RESPIRATION RATE: 18 BRPM | WEIGHT: 162 LBS | BODY MASS INDEX: 23.19 KG/M2 | HEART RATE: 111 BPM | TEMPERATURE: 98 F | OXYGEN SATURATION: 95 % | DIASTOLIC BLOOD PRESSURE: 92 MMHG | HEIGHT: 70 IN | SYSTOLIC BLOOD PRESSURE: 154 MMHG

## 2025-03-07 DIAGNOSIS — Z13.29 THYROID DISORDER SCREENING: ICD-10-CM

## 2025-03-07 DIAGNOSIS — M19.90 ARTHRITIS: ICD-10-CM

## 2025-03-07 DIAGNOSIS — E78.00 HYPERCHOLESTEREMIA: ICD-10-CM

## 2025-03-07 DIAGNOSIS — I10 PRIMARY HYPERTENSION: ICD-10-CM

## 2025-03-07 DIAGNOSIS — Z00.00 MEDICARE ANNUAL WELLNESS VISIT, SUBSEQUENT: Primary | ICD-10-CM

## 2025-03-07 DIAGNOSIS — E61.2 MAGNESIUM DEFICIENCY: ICD-10-CM

## 2025-03-07 DIAGNOSIS — C61 PROSTATE CANCER (HCC): ICD-10-CM

## 2025-03-07 DIAGNOSIS — E55.9 VITAMIN D DEFICIENCY: ICD-10-CM

## 2025-03-07 PROCEDURE — G0438 PPPS, INITIAL VISIT: HCPCS | Performed by: FAMILY MEDICINE

## 2025-03-07 NOTE — ASSESSMENT & PLAN NOTE
Chronic   Pending repeat level   Orders:    Comprehensive metabolic panel; Future    Lipid Panel with Direct LDL reflex; Future     Patient arrived from [address] with RN in stable condition. No reports of pain. Reports SOB and wheezing, RT called for breathing tx. L femoral arterial site WNL. Patient verbalized understanding of bedrest and activity restrictions.

## 2025-03-07 NOTE — ASSESSMENT & PLAN NOTE
Chronic - noting he is not interested in taking any medication  Information on DASH diet provided

## 2025-03-07 NOTE — PROGRESS NOTES
Name: Lucio Torres      : 1955      MRN: [id number]  Encounter Provider: Margaret Luo DO  Encounter Date: 3/7/2025   Encounter department: [facility]    Assessment & Plan  Medicare annual wellness visit, subsequent         Vitamin D deficiency  Reporting he takes vitamin D and K2  Pending repeat level  Orders:    Vitamin D 25 hydroxy; Future    Magnesium deficiency  Chronic - reporting he is taking mag daily  Pending level   Orders:    Magnesium; Future    Primary hypertension  Chronic - noting he is not interested in taking any medication  Information on DASH diet provided        Arthritis  Chronic - information given on stretches and exercises for ROM        Prostate cancer (HCC)  Chronic - follows with hem-onc        Hypercholesteremia  Chronic   Pending repeat level   Orders:    Comprehensive metabolic panel; Future    Lipid Panel with Direct LDL reflex; Future    Thyroid disorder screening    Orders:    TSH, 3rd generation with Free T4 reflex; Future       Preventive health issues were discussed with patient, and age appropriate screening tests were ordered as noted in patient's After Visit Summary. Personalized health advice and appropriate referrals for health education or preventive services given if needed, as noted in patient's After Visit Summary.    History of Present Illness     Noting he has been making diet modifications for Joe 6 prostate cancer:  -taking vitamin D  -not interested in taking medications        Patient Care Team:  Margaret Luo DO as PCP - General (Family Medicine)    Review of Systems   Constitutional:  Negative for fever.   HENT:  Negative for congestion, hearing loss, postnasal drip, rhinorrhea, sneezing and sore throat.    Eyes:  Negative for visual disturbance.   Respiratory:  Negative for cough, shortness of breath and wheezing.    Cardiovascular:  Negative for chest pain and palpitations.   Gastrointestinal:  Negative  for abdominal pain, constipation, diarrhea, nausea and vomiting.   Musculoskeletal:  Negative for arthralgias, myalgias and neck pain.   Skin:  Negative for rash and wound.   Neurological:  Negative for weakness and numbness.     Medical History Reviewed by provider this encounter:       Annual Wellness Visit Questionnaire   Lucio is here for his Subsequent Wellness visit. Last Medicare Wellness visit information reviewed, patient interviewed and updates made to the record today.      Health Risk Assessment:   Patient rates overall health as very good. Patient feels that their physical health rating is same. Patient is very satisfied with their life. Eyesight was rated as slightly worse. Hearing was rated as same. Patient feels that their emotional and mental health rating is same. Patients states they are never, rarely angry. Patient states they are never, rarely unusually tired/fatigued. Pain experienced in the last 7 days has been none. Patient states that he has experienced no weight loss or gain in last 6 months.     Depression Screening:   PHQ-2 Score: 0      Fall Risk Screening:   In the past year, patient has experienced: no history of falling in past year      Home Safety:  Patient does not have trouble with stairs inside or outside of their home. Patient has working smoke alarms and has working carbon monoxide detector. Home safety hazards include: none.     Nutrition:   Current diet is Regular.     Medications:   Patient is currently taking over-the-counter supplements. OTC medications include: see medication list. Patient is able to manage medications.     Activities of Daily Living (ADLs)/Instrumental Activities of Daily Living (IADLs):   Walk and transfer into and out of bed and chair?: Yes  Dress and groom yourself?: Yes    Bathe or shower yourself?: Yes    Feed yourself? Yes  Do your laundry/housekeeping?: Yes  Manage your money, pay your bills and track your expenses?: Yes  Make your own meals?: Yes     Do your own shopping?: Yes    Previous Hospitalizations:   Any hospitalizations or ED visits within the last 12 months?: No      Advance Care Planning:   Living will: Yes    Durable POA for healthcare: No    Advanced directive: Yes      PREVENTIVE SCREENINGS      Cardiovascular Screening:    General: Screening Not Indicated and History Lipid Disorder      Prostate Cancer Screening:    General: History Prostate Cancer      Abdominal Aortic Aneurysm (AAA) Screening:    Risk factors include: age between 65-74 yo and tobacco use        Lung Cancer Screening:     General: Screening Not Indicated      Hepatitis C Screening:    General: Screening Current    Screening, Brief Intervention, and Referral to Treatment (SBIRT)     Screening  Typical number of drinks in a day: 0  Typical number of drinks in a week: 0  Interpretation: Low risk drinking behavior.    AUDIT-C Screenin) How often did you have a drink containing alcohol in the past year? monthly or less  2) How many drinks did you have on a typical day when you were drinking in the past year? 0  3) How often did you have 6 or more drinks on one occasion in the past year? never    AUDIT-C Score: 1  Interpretation: Score 0-3 (male): Negative screen for alcohol misuse    Single Item Drug Screening:  How often have you used an illegal drug (including marijuana) or a prescription medication for non-medical reasons in the past year? never    Single Item Drug Screen Score: 0  Interpretation: Negative screen for possible drug use disorder    Social Drivers of Health     Financial Resource Strain: Low Risk  (3/6/2025)    Overall Financial Resource Strain (CARDIA)     Difficulty of Paying Living Expenses: Not hard at all   Food Insecurity: No Food Insecurity (3/6/2025)    Hunger Vital Sign     Worried About Running Out of Food in the Last Year: Never true     Ran Out of Food in the Last Year: Never true   Transportation Needs: No Transportation Needs (3/6/2025)    PRAPARMARIBEL  "- Transportation     Lack of Transportation (Medical): No     Lack of Transportation (Non-Medical): No   Housing Stability: Low Risk  (3/6/2025)    Housing Stability Vital Sign     Unable to Pay for Housing in the Last Year: No     Number of Times Moved in the Last Year: 0     Homeless in the Last Year: No   Utilities: Not At Risk (3/6/2025)    [facility] Utilities     Threatened with loss of utilities: No     No results found.    Objective   /92 (BP Location: Left arm, Patient Position: Sitting, Cuff Size: Adult)   Pulse (!) 111   Temp 98 °F (36.7 °C) (Tympanic)   Resp 18   Ht 5' 10\" (1.778 m)   Wt 73.5 kg (162 lb)   SpO2 95%   BMI 23.24 kg/m²     Physical Exam  Vitals and nursing note reviewed.   Constitutional:       General: He is not in acute distress.     Appearance: He is well-developed.   HENT:      Head: Normocephalic and atraumatic.   Eyes:      Conjunctiva/sclera: Conjunctivae normal.   Cardiovascular:      Rate and Rhythm: Normal rate and regular rhythm.      Heart sounds: No murmur heard.  Pulmonary:      Effort: Pulmonary effort is normal. No respiratory distress.      Breath sounds: Normal breath sounds.   Abdominal:      Palpations: Abdomen is soft.      Tenderness: There is no abdominal tenderness.   Musculoskeletal:         General: No swelling.      Cervical back: Neck supple.   Skin:     General: Skin is warm and dry.      Capillary Refill: Capillary refill takes less than 2 seconds.   Neurological:      Mental Status: He is alert.   Psychiatric:         Mood and Affect: Mood normal.         "

## 2025-03-27 ENCOUNTER — APPOINTMENT (OUTPATIENT)
Dept: LAB | Facility: CLINIC | Age: 70
End: 2025-03-27
Payer: MEDICARE

## 2025-03-27 DIAGNOSIS — E55.9 VITAMIN D DEFICIENCY: ICD-10-CM

## 2025-03-27 DIAGNOSIS — E61.2 MAGNESIUM DEFICIENCY: ICD-10-CM

## 2025-03-27 DIAGNOSIS — C61 PROSTATE CANCER (HCC): Primary | ICD-10-CM

## 2025-03-27 DIAGNOSIS — E78.00 HYPERCHOLESTEREMIA: ICD-10-CM

## 2025-03-27 DIAGNOSIS — Z13.29 THYROID DISORDER SCREENING: ICD-10-CM

## 2025-03-27 LAB
25(OH)D3 SERPL-MCNC: 61.8 NG/ML (ref 30–100)
ALBUMIN SERPL BCG-MCNC: 4.4 G/DL (ref 3.5–5)
ALP SERPL-CCNC: 62 U/L (ref 34–104)
ALT SERPL W P-5'-P-CCNC: 22 U/L (ref 7–52)
ANION GAP SERPL CALCULATED.3IONS-SCNC: 7 MMOL/L (ref 4–13)
AST SERPL W P-5'-P-CCNC: 19 U/L (ref 13–39)
BILIRUB SERPL-MCNC: 0.53 MG/DL (ref 0.2–1)
BUN SERPL-MCNC: 29 MG/DL (ref 5–25)
CALCIUM SERPL-MCNC: 9.4 MG/DL (ref 8.4–10.2)
CHLORIDE SERPL-SCNC: 104 MMOL/L (ref 96–108)
CHOLEST SERPL-MCNC: 226 MG/DL (ref ?–200)
CO2 SERPL-SCNC: 29 MMOL/L (ref 21–32)
CREAT SERPL-MCNC: 0.82 MG/DL (ref 0.6–1.3)
GFR SERPL CREATININE-BSD FRML MDRD: 90 ML/MIN/1.73SQ M
GLUCOSE P FAST SERPL-MCNC: 103 MG/DL (ref 65–99)
HDLC SERPL-MCNC: 58 MG/DL
LDLC SERPL CALC-MCNC: 141 MG/DL (ref 0–100)
MAGNESIUM SERPL-MCNC: 2.4 MG/DL (ref 1.9–2.7)
POTASSIUM SERPL-SCNC: 4.8 MMOL/L (ref 3.5–5.3)
PROT SERPL-MCNC: 7.1 G/DL (ref 6.4–8.4)
SODIUM SERPL-SCNC: 140 MMOL/L (ref 135–147)
TRIGL SERPL-MCNC: 133 MG/DL (ref ?–150)
TSH SERPL DL<=0.05 MIU/L-ACNC: 1.43 UIU/ML (ref 0.45–4.5)

## 2025-03-27 PROCEDURE — 80061 LIPID PANEL: CPT

## 2025-03-27 PROCEDURE — 84443 ASSAY THYROID STIM HORMONE: CPT

## 2025-03-27 PROCEDURE — 80053 COMPREHEN METABOLIC PANEL: CPT

## 2025-03-27 PROCEDURE — 83735 ASSAY OF MAGNESIUM: CPT

## 2025-03-27 PROCEDURE — 36415 COLL VENOUS BLD VENIPUNCTURE: CPT

## 2025-03-27 PROCEDURE — 82306 VITAMIN D 25 HYDROXY: CPT

## 2025-04-01 ENCOUNTER — RESULTS FOLLOW-UP (OUTPATIENT)
Dept: CCU | Facility: HOSPITAL | Age: 70
End: 2025-04-01

## 2025-04-24 ENCOUNTER — APPOINTMENT (OUTPATIENT)
Dept: LAB | Facility: CLINIC | Age: 70
End: 2025-04-24
Payer: MEDICARE

## 2025-04-24 DIAGNOSIS — C61 PROSTATE CANCER (HCC): ICD-10-CM

## 2025-04-24 LAB — PSA SERPL-MCNC: 23.2 NG/ML (ref 0–4)

## 2025-04-24 PROCEDURE — 36415 COLL VENOUS BLD VENIPUNCTURE: CPT

## 2025-04-24 PROCEDURE — 84153 ASSAY OF PSA TOTAL: CPT

## 2025-04-25 ENCOUNTER — TELEPHONE (OUTPATIENT)
Age: 70
End: 2025-04-25

## 2025-04-25 NOTE — TELEPHONE ENCOUNTER
Call received from [facility]. Wanted to let us know that they can only do his scan if he does not have any hip replacements. Said she will be holding onto the script for his MRI.

## 2025-04-25 NOTE — TELEPHONE ENCOUNTER
FYI:  Pt calling went to schedule his MRI and was told Rx .    Please resubmit RX for MRI Abd/pel w/o contrast Dx Prostate Ca and pt was very firm must be W/O CONTRAST.    Pt going to [place]  and will need to print off his slip and fax to them today.

## 2025-04-28 ENCOUNTER — TELEPHONE (OUTPATIENT)
Age: 70
End: 2025-04-28

## 2025-04-28 ENCOUNTER — TELEPHONE (OUTPATIENT)
Dept: HEMATOLOGY ONCOLOGY | Facility: MEDICAL CENTER | Age: 70
End: 2025-04-28

## 2025-04-28 NOTE — TELEPHONE ENCOUNTER
Patient called, asking for a new MRI order to be sent to [facility]. He is asking for it to be corrected with it stating without contrast. He spoke with [facility] this morning stating they have not received it yet

## 2025-04-28 NOTE — TELEPHONE ENCOUNTER
Left voicemail with patient for fax number to send MRI order    MRI order annotated for without contrast faxed to [phone]

## 2025-04-30 ENCOUNTER — TELEPHONE (OUTPATIENT)
Age: 70
End: 2025-04-30

## 2025-04-30 DIAGNOSIS — C61 PROSTATE CANCER (HCC): Primary | ICD-10-CM

## 2025-04-30 NOTE — TELEPHONE ENCOUNTER
[facility] stated pt is there now for his MRI but they need the recent PSA faxed over ASAP because pt is on the table. They provided a fax number of [phone]. Also gave a phone number of [phone] ext [phone]. Thank you!

## 2025-04-30 NOTE — TELEPHONE ENCOUNTER
Allison calling from [facility] to reports patient is there now for his MRI of prostate and they do not have a valid order.  Mónica reports received a fax from Dr. De La Cruz's office on 4/28 with a previous outdated order for MRI prostate.  Mónica asking for new Mri order to be faxed to [phone].

## 2025-04-30 NOTE — TELEPHONE ENCOUNTER
Mónica from [facility] called, stating they received the MRI order but it still states with contrast when its to be without. Patient is there now and needs new script faxed

## 2025-04-30 NOTE — TELEPHONE ENCOUNTER
Commented in MRI order NO  contrast -was faxed previously  Spoke to MRI dept at [phone]  Order received

## 2025-06-02 ENCOUNTER — OFFICE VISIT (OUTPATIENT)
Dept: HEMATOLOGY ONCOLOGY | Facility: MEDICAL CENTER | Age: 70
End: 2025-06-02
Payer: MEDICARE

## 2025-06-02 VITALS
TEMPERATURE: 97.9 F | BODY MASS INDEX: 23.05 KG/M2 | SYSTOLIC BLOOD PRESSURE: 154 MMHG | WEIGHT: 161 LBS | OXYGEN SATURATION: 97 % | RESPIRATION RATE: 18 BRPM | DIASTOLIC BLOOD PRESSURE: 90 MMHG | HEIGHT: 70 IN | HEART RATE: 84 BPM

## 2025-06-02 DIAGNOSIS — C61 PROSTATE CANCER (HCC): Primary | ICD-10-CM

## 2025-06-02 PROCEDURE — 99214 OFFICE O/P EST MOD 30 MIN: CPT | Performed by: INTERNAL MEDICINE

## 2025-06-02 NOTE — PROGRESS NOTES
Lucio Torres  1955  [address] [facility] [place]  [address]  [place] [id number]    DISCUSSION/SUMMARY:    30 minutes was spent with patient in direct consultation and review of the chart.    70-year-old male diagnosed with prostate adenocarcinoma (1+ core, Swan score of 6) in 2015.  The plan since 2015 has been surveillance.  Mr. Torres continues to feel more or less as before, clinically there are no concerning findings.  The PSA has jumped up, see below.  Additionally, the recent by parametric MRI demonstrates concerning findings possibly consistent with progression.  We we discussed options.    Patient has always demonstrated a good understanding of the situation.  Patient wants to recheck the PSA in approximately 30 days (ordered).  If the PSA continues to be elevated, patient would then go for the PSMA PET/CT.  With those results, patient would need to speak to  as far as possible treatment options.    At this time, follow-up is in 1 year but this may change depending upon the above.    As discussed previously, patient follows up with Dr. Lincoln Leal (urologist in [facility]) also.  He reads the mpMRI/pelvis.  If evidence of progression, patient may wish to travel to [place] for urologic evaluation.    Mr. Torres knows to call the hematology/oncology office if there are any other questions or concerns or if he develops any  issues, body aches, unplanned weight loss, hematuria etc.    Carefully review your medication list and verify that the list is accurate and up-to-date. Please call the hematology/oncology office if there are medications missing from the list, medications on the list that you are not currently taking or if there is a dosage or instruction that is different from how you're taking that medication.    Patient goals and areas of care: Recheck PSA  Barriers to care:  None  Patient is able to  self-care  ______________________________________________________________________________________    Chief Complaint   Patient presents with    Follow-up    Prostate cancer on surveillance     History of Present Illness:  70-year-old male with a somewhat complicated prostate cancer history referred for oncology evaluation.    Mr. Torres was diagnosed with a urinary tract infection in 2015.  At that time patient was found to have an elevated PSA.  Patient received antibiotics and the UTI resolved.  Patient was subsequently followed by Urology; PSA remained elevated.  Patient underwent a 12 core biopsy.  Reportedly 1 of the 12 cores demonstrated Joe 6 adenocarcinoma.  There was no evidence of locally advanced or metastatic disease - patient started surveillance.  Patient states that his PSA initially was in the 3-4 range but more recently has been in the 8-10 range.    Mr. Torres has been followed by Dr. Best; patient has also been followed by a urologist in [place], Dr. Lincoln Leal.  Patient underwent a 2nd biopsy approximately 4 years ago.  Mr. Torres states that the 2nd pathology results were about the same as before, no evidence of disease progression.    The plan up to this point has been repeating the multiparamedic MRIs without contrast.  The scans are read by Dr. Leal in the [place]; patient either then has a direct appointment with Dr. Leal or patient has a tele visit.  As above, patient also follows with Dr. Best.  As per patient, recently there has been a difference of opinion as far as repeating a prostate biopsy (or not).    Mr. Torres states feeling okay, baseline.  No  issues other than possibly more frequent urination.  No hematuria.  Appetite is good, weight is stable.  No body aches.  No headaches or dizziness.  Routine health maintenance and medical care is up-to-date.    Review of Systems   Constitutional: Negative.  Negative for appetite change, fever and unexpected weight  change.   HENT: Negative.     Eyes: Negative.    Respiratory: Negative.     Cardiovascular: Negative.    Gastrointestinal: Negative.    Endocrine: Negative.    Genitourinary:  Positive for urgency. Negative for difficulty urinating, dysuria and hematuria.   Musculoskeletal: Negative.    Skin: Negative.    Allergic/Immunologic: Negative.    Neurological: Negative.  Negative for dizziness and syncope.   Hematological: Negative.    Psychiatric/Behavioral: Negative.     All other systems reviewed and are negative.    Patient Active Problem List   Diagnosis    Acrochordon    Hypercholesteremia    Prostate cancer (HCC)    Annual physical exam    Radicular pain of upper extremity    Primary hypertension    Verruca vulgaris    Arthritis     Past Medical History:   Diagnosis Date    Cancer (HCC)     prostate cancer zoey 6    Dysuria     last assessed 7/30/14    Elevated PSA     resolved 3/24/17    Hx of removal of neck cyst     last assessed 9/8/15    Prostate cancer (HCC)      Past surgical history:  No prior blood transfusions    Family History   Problem Relation Name Age of Onset    Leukemia Mother      Multiple sclerosis Mother      Multiple sclerosis Sister     Family history:  No known familial or genetic diseases including prostate cancer    Social History     Socioeconomic History    Marital status: /Civil Union     Spouse name: Not on file    Number of children: Not on file    Years of education: Not on file    Highest education level: Not on file   Occupational History    Not on file   Tobacco Use    Smoking status: Former    Smokeless tobacco: Never   Vaping Use    Vaping status: Never Used   Substance and Sexual Activity    Alcohol use: Yes    Drug use: No    Sexual activity: Not on file   Other Topics Concern    Not on file   Social History Narrative    Not on file     Social Drivers of Health     Financial Resource Strain: Low Risk  (3/6/2025)    Overall Financial Resource Strain (CARDIA)      Difficulty of Paying Living Expenses: Not hard at all   Food Insecurity: No Food Insecurity (3/6/2025)    Nursing - Inadequate Food Risk Classification     Worried About Running Out of Food in the Last Year: Never true     Ran Out of Food in the Last Year: Never true     Ran Out of Food in the Last Year: Not on file   Transportation Needs: No Transportation Needs (3/6/2025)    PRAPARE - Transportation     Lack of Transportation (Medical): No     Lack of Transportation (Non-Medical): No   Physical Activity: Not on file   Stress: Not on file   Social Connections: Not on file   Intimate Partner Violence: Not on file   Housing Stability: Low Risk  (3/6/2025)    Housing Stability Vital Sign     Unable to Pay for Housing in the Last Year: No     Number of Times Moved in the Last Year: 0     Homeless in the Last Year: No   Social history:  No tobacco, alcohol or drug abuse, no toxic exposure    Current Outpatient Medications:     MAGNESIUM CITRATE PO, Take by mouth, Disp: , Rfl:     magnesium citrate solution, Take 296 mL by mouth once, Disp: , Rfl:     Multiple Vitamin (multivitamin) tablet, Take 1 tablet by mouth in the morning., Disp: , Rfl:     Saw Palmetto 160-15 MG CAPS, Take by mouth, Disp: , Rfl:     Turmeric (CURCUMIN 95 PO), Take by mouth, Disp: , Rfl:     Vitamin D-Vitamin K (VITAMIN K2-VITAMIN D3 PO), Take by mouth, Disp: , Rfl:     Zinc Acetate, Oral, (ZINC ACETATE PO), Take by mouth (Patient taking differently: Take by mouth seasonal), Disp: , Rfl:     Chelsie, Zingiber officinalis, (CHELSIE PO), Take by mouth, Disp: , Rfl:     VITAMIN K PO, Take 400 mg by mouth   (Patient not taking: Reported on 6/2/2025), Disp: , Rfl:     No Known Allergies    Vitals:    06/02/25 1249   BP: 154/90   Pulse: 84   Resp: 18   Temp: 97.9 °F (36.6 °C)   SpO2: 97%     Physical Exam  Constitutional:       Appearance: Normal appearance. He is well-developed.      Comments: Well-nourished middle-aged male, no respiratory distress, no  signs of pain   HENT:      Head: Normocephalic and atraumatic.      Right Ear: External ear normal.      Left Ear: External ear normal.      Nose: Nose normal.     Eyes:      General: No scleral icterus.     Conjunctiva/sclera: Conjunctivae normal.      Pupils: Pupils are equal, round, and reactive to light.     Neck:      Vascular: No carotid bruit.     Cardiovascular:      Rate and Rhythm: Normal rate and regular rhythm.      Heart sounds: Normal heart sounds.   Pulmonary:      Effort: Pulmonary effort is normal.      Breath sounds: Normal breath sounds.   Abdominal:      Palpations: Abdomen is soft.      Tenderness: There is no abdominal tenderness. There is no guarding.      Comments: Soft, nontender, +bowel sounds, cannot palpate liver or spleen, no guarding, no rigidity or rebound     Musculoskeletal:         General: Normal range of motion.      Cervical back: Normal range of motion and neck supple. No rigidity or tenderness.   Lymphadenopathy:      Cervical: No cervical adenopathy.     Skin:     General: Skin is warm.      Capillary Refill: Capillary refill takes less than 2 seconds.      Comments: Good color, warm, moist, no petechiae or ecchymoses     Neurological:      Mental Status: He is alert and oriented to person, place, and time.      Deep Tendon Reflexes: Reflexes are normal and symmetric.     Psychiatric:         Mood and Affect: Mood normal.         Behavior: Behavior normal.         Thought Content: Thought content normal.         Judgment: Judgment normal.       Lymphatics:  No adenopathy in the neck, supraclavicular region    Labs        3/27/2025 BUN = 29 creatinine = 0.82 calcium = 9.4 LFTs WNL    Last CBC was from March 2023    Imaging    Recent by paramedic MRI performed on 4/30/2025 was reviewed by Dr. Leal, [facility].  Impression stated that the MRI was negative for neurovascular bundle involvement, extracapsular extension, seminal vesicle extension, pathologic adenopathy or bone  metastases.  The transitional zone demonstrates a change in size, morphology and functional imaging of the previously biopsied concordant focal abnormality in the anterior right and left paramedian extreme apex 6 and mid gland with enlarged anterior and posterior median lobe.  BPH nodules and an enlarged gland.  The peripheral zone was negative for significant concordant focal abnormality with bilateral asymmetrical homogeneous decreased signal intensity second opinion recommended a PSMA PET/CT to evaluate the different lobes of prostate and exclude local or regional metastatic disease.  Patient may also require repeat biopsy.      [id number] MRI Pelvis w/o Contrast     3/16/2023 bi-parametric MRI of prostate ([place])/.  Impression stated that the by parametric MRI was negative for significant or index lesion, intermediate or high-grade neoplastic disease.  Peripheral zone demonstrates no concordant focal abnormality with changes compatible with chronic inflammation or prostatitis.  Transitional zone demonstrates an enlarged median lobe and highly organized BPH nodules and enlarged normal size gland.  Recommend continued surveillance.  PI-RADS category 2 (low, clinically significant cancer unlikely).            02/25/2021 [facility] - biparametric MRI prostate without contrast.  Impression stated MRI negative for significant or index lesion, indeterminate or high-grade neoplastic disease.  Negative for concordant target lesion.  Negative for neurovascular bundle involvement, extracapsular extension, seminal vesicle extension, pathologic lymphadenopathy or bone metastases.  Transitional zone demonstrates an enlarged median lobe and BPH nodules in an enlarged gland.  Peripheral zone negative for concurrent focal abnormality with bilateral, symmetrical, homogeneous decrease signal intensity compatible with chronic inflammation or prostatitis.  Recommend continued surveillance with repeat PSA at 6  and 12 months, recommend repeat bpMRI in 12 months.  Final impression stated PI-RADS category 2, clinically significant cancer is unlikely to be present.    Pathology    Case Report   Surgical Pathology Report                         Case: [id number]                                    Authorizing Provider:  Hai De La Cruz MD           Collected:           12/29/2021 0706               Ordering Location:     [facility]      Received:            12/29/2021 [facility]                                      Hospital Specialty                                                                                   Laboratory                                                                    Pathologist:           Sudhir Burnham MD                                                    Specimen:    Prostate, Prostate Biopsy ( 27 slides [id number] [facility], collected                        9/12/2017)                                                                                 Final Diagnosis   Prostate, Prostate Biopsy ( 27 slides [id number] [facility], collected 9/12/2017):     A. LEFT APEX, NEEDLE BIOPSY:  - Prostatic adenocarcinoma, acinar type, Blakeslee score 3 + 3 = 6, Prognostic Grade Group 1, continuously involving 2 of 2 cores (10%, 5%).  - Perineural invasion: Not identified.     B. LEFT MID, NEEDLE BIOPSY:  - Benign prostate tissue.     C. LEFT BASE, NEEDLE BIOPSY:  - Benign prostate tissue.     D. LEFT LATERAL APEX, NEEDLE BIOPSY:  - Prostatic adenocarcinoma, acinar type, Joe score 3 + 3 = 6, Prognostic Grade Group 1, continuously involving 1 of 2 cores (5%, 0%).  - Perineural invasion: Not identified.     E. LEFT LATERAL MED, NEEDLE BIOPSY:  - Benign prostate tissue.     F. LEFT LATERAL BASE, NEEDLE BIOPSY:  - Benign prostate tissue.     G. RIGHT APEX, NEEDLE BIOPSY:  - Benign prostate tissue.     H. RIGHT MID, NEEDLE BIOPSY:  - Benign prostate tissue.     I.   RIGHT BASE, NEEDLE BIOPSY:  -  Benign prostate tissue.     J. RIGHT LATERAL APEX, NEEDLE BIOPSY:  - Benign prostate tissue.     K. RIGHT LATERAL MID, NEEDLE BIOPSY:  - Benign prostate tissue.     L. RIGHT LATERAL BASE, NEEDLE BIOPSY  - Benign prostate tissue.   Electronically signed by Sudhir Burnham MD on 12/29/2021 at 11:15 AM                    Clindamycin Counseling: I counseled the patient regarding use of clindamycin as an antibiotic for prophylactic and/or therapeutic purposes. Clindamycin is active against numerous classes of bacteria, including skin bacteria. Side effects may include nausea, diarrhea, gastrointestinal upset, rash, hives, yeast infections, and in rare cases, colitis.

## 2025-07-25 ENCOUNTER — APPOINTMENT (OUTPATIENT)
Dept: LAB | Facility: CLINIC | Age: 70
End: 2025-07-25
Attending: INTERNAL MEDICINE
Payer: MEDICARE

## 2025-07-25 LAB — PSA SERPL-MCNC: 18.88 NG/ML (ref 0–4)

## 2025-07-29 DIAGNOSIS — C61 PROSTATE CANCER (HCC): Primary | ICD-10-CM

## 2025-08-15 ENCOUNTER — HOSPITAL ENCOUNTER (OUTPATIENT)
Dept: RADIOLOGY | Age: 70
Discharge: HOME | End: 2025-08-15
Attending: INTERNAL MEDICINE
Payer: MEDICARE